# Patient Record
Sex: FEMALE | Race: WHITE | Employment: FULL TIME | URBAN - METROPOLITAN AREA
[De-identification: names, ages, dates, MRNs, and addresses within clinical notes are randomized per-mention and may not be internally consistent; named-entity substitution may affect disease eponyms.]

---

## 2021-02-22 ENCOUNTER — TELEPHONE (OUTPATIENT)
Dept: OBGYN CLINIC | Facility: CLINIC | Age: 36
End: 2021-02-22

## 2021-02-22 NOTE — TELEPHONE ENCOUNTER
Pt calling to report +HPT and LMP of 12/22. Pt stated her OB is in [place] and she has had 2 US's done already but [place] is too far of a commute for her. Pt informed we have male and female providers and she will see all of them throughout care.  Pt infor

## 2021-02-24 ENCOUNTER — TELEPHONE (OUTPATIENT)
Dept: OBGYN CLINIC | Facility: CLINIC | Age: 36
End: 2021-02-24

## 2021-02-24 NOTE — TELEPHONE ENCOUNTER
OB US results dated 2/6/21 and 2/20/21 received via fax. Pt is scheduled for OBN appt on 3/3. Reports placed above Francy's desk in front office.

## 2021-03-03 ENCOUNTER — PATIENT MESSAGE (OUTPATIENT)
Dept: OBGYN CLINIC | Facility: CLINIC | Age: 36
End: 2021-03-03

## 2021-03-03 ENCOUNTER — NURSE ONLY (OUTPATIENT)
Dept: OBGYN CLINIC | Facility: CLINIC | Age: 36
End: 2021-03-03
Payer: COMMERCIAL

## 2021-03-03 VITALS — HEIGHT: 62 IN

## 2021-03-03 DIAGNOSIS — Z34.91 FIRST TRIMESTER PREGNANCY: Primary | ICD-10-CM

## 2021-03-03 RX ORDER — CHOLECALCIFEROL (VITAMIN D3) 25 MCG
1 TABLET,CHEWABLE ORAL DAILY
COMMUNITY

## 2021-03-03 NOTE — TELEPHONE ENCOUNTER
From: Janis Perez  To: Margo Griffin DO  Sent: 3/3/2021 1:39 PM CST  Subject: Non-Urgent Medical Question    I had a Nurse Education phone call today regarding a new pregnancy. I will be a new patient to your practice.    I am going to be going to the

## 2021-03-03 NOTE — PROGRESS NOTES
Pt seen for OBN-PC appt today with no complaints. Normal PN labs, 1 hr GTT ordered. Pt advised all labs must be completed and resulted prior to MD appt. Pt accepted first NPN appt with [facility] on 3/16. Pt advised to come alone d/t current pandemic.      Pt s father, or anyone in either family with:  Patient's age 28 years or older as of estimated date of delivery: Yes   Thalassemia ([facility], [facility], [address] [facility], or  background):  MCV less than 80: No   Neural tube defect (Meningomyelocele, Spina bifida, o

## 2021-03-06 ENCOUNTER — LAB ENCOUNTER (OUTPATIENT)
Dept: LAB | Facility: HOSPITAL | Age: 36
End: 2021-03-06
Attending: OBSTETRICS & GYNECOLOGY
Payer: COMMERCIAL

## 2021-03-06 DIAGNOSIS — Z34.91 FIRST TRIMESTER PREGNANCY: ICD-10-CM

## 2021-03-06 LAB
ANTIBODY SCREEN: NEGATIVE
BASOPHILS # BLD AUTO: 0.03 X10(3) UL (ref 0–0.2)
BASOPHILS NFR BLD AUTO: 0.4 %
DEPRECATED RDW RBC AUTO: 45.1 FL (ref 35.1–46.3)
EOSINOPHIL # BLD AUTO: 0.03 X10(3) UL (ref 0–0.7)
EOSINOPHIL NFR BLD AUTO: 0.4 %
ERYTHROCYTE [DISTWIDTH] IN BLOOD BY AUTOMATED COUNT: 13.2 % (ref 11–15)
GLUCOSE 1H P GLC SERPL-MCNC: 58 MG/DL
HBV SURFACE AG SER-ACNC: <0.1 [IU]/L
HBV SURFACE AG SERPL QL IA: NONREACTIVE
HCT VFR BLD AUTO: 40.5 %
HGB BLD-MCNC: 13.5 G/DL
IMM GRANULOCYTES # BLD AUTO: 0.04 X10(3) UL (ref 0–1)
IMM GRANULOCYTES NFR BLD: 0.5 %
LYMPHOCYTES # BLD AUTO: 1.97 X10(3) UL (ref 1–4)
LYMPHOCYTES NFR BLD AUTO: 25 %
MCH RBC QN AUTO: 31.4 PG (ref 26–34)
MCHC RBC AUTO-ENTMCNC: 33.3 G/DL (ref 31–37)
MCV RBC AUTO: 94.2 FL
MONOCYTES # BLD AUTO: 0.65 X10(3) UL (ref 0.1–1)
MONOCYTES NFR BLD AUTO: 8.3 %
NEUTROPHILS # BLD AUTO: 5.15 X10 (3) UL (ref 1.5–7.7)
NEUTROPHILS # BLD AUTO: 5.15 X10(3) UL (ref 1.5–7.7)
NEUTROPHILS NFR BLD AUTO: 65.4 %
PLATELET # BLD AUTO: 354 10(3)UL (ref 150–450)
RBC # BLD AUTO: 4.3 X10(6)UL
RH BLOOD TYPE: POSITIVE
RUBV IGG SER QL: POSITIVE
RUBV IGG SER-ACNC: 235.9 IU/ML (ref 10–?)
WBC # BLD AUTO: 7.9 X10(3) UL (ref 4–11)

## 2021-03-06 PROCEDURE — 87086 URINE CULTURE/COLONY COUNT: CPT

## 2021-03-06 PROCEDURE — 86762 RUBELLA ANTIBODY: CPT

## 2021-03-06 PROCEDURE — 86901 BLOOD TYPING SEROLOGIC RH(D): CPT

## 2021-03-06 PROCEDURE — 86780 TREPONEMA PALLIDUM: CPT

## 2021-03-06 PROCEDURE — 86850 RBC ANTIBODY SCREEN: CPT

## 2021-03-06 PROCEDURE — 36415 COLL VENOUS BLD VENIPUNCTURE: CPT

## 2021-03-06 PROCEDURE — 87340 HEPATITIS B SURFACE AG IA: CPT

## 2021-03-06 PROCEDURE — 85025 COMPLETE CBC W/AUTO DIFF WBC: CPT

## 2021-03-06 PROCEDURE — 86900 BLOOD TYPING SEROLOGIC ABO: CPT

## 2021-03-06 PROCEDURE — 87389 HIV-1 AG W/HIV-1&-2 AB AG IA: CPT

## 2021-03-06 PROCEDURE — 82950 GLUCOSE TEST: CPT

## 2021-03-08 LAB — T PALLIDUM AB SER QL: NEGATIVE

## 2021-03-16 ENCOUNTER — INITIAL PRENATAL (OUTPATIENT)
Dept: OBGYN CLINIC | Facility: CLINIC | Age: 36
End: 2021-03-16
Payer: COMMERCIAL

## 2021-03-16 VITALS
BODY MASS INDEX: 25.19 KG/M2 | HEART RATE: 102 BPM | WEIGHT: 140.38 LBS | HEIGHT: 62.75 IN | SYSTOLIC BLOOD PRESSURE: 134 MMHG | DIASTOLIC BLOOD PRESSURE: 87 MMHG

## 2021-03-16 DIAGNOSIS — Z34.01 ENCOUNTER FOR SUPERVISION OF NORMAL FIRST PREGNANCY IN FIRST TRIMESTER: Primary | ICD-10-CM

## 2021-03-16 LAB
APPEARANCE: CLEAR
MULTISTIX LOT#: 5077 NUMERIC
PH, URINE: 6.5 (ref 4.5–8)
SPECIFIC GRAVITY: 1.02 (ref 1–1.03)
URINE-COLOR: YELLOW
UROBILINOGEN,SEMI-QN: 0.2 MG/DL (ref 0–1.9)

## 2021-03-16 PROCEDURE — 3008F BODY MASS INDEX DOCD: CPT | Performed by: OBSTETRICS & GYNECOLOGY

## 2021-03-16 PROCEDURE — 81002 URINALYSIS NONAUTO W/O SCOPE: CPT | Performed by: OBSTETRICS & GYNECOLOGY

## 2021-03-16 PROCEDURE — 3075F SYST BP GE 130 - 139MM HG: CPT | Performed by: OBSTETRICS & GYNECOLOGY

## 2021-03-16 PROCEDURE — 3079F DIAST BP 80-89 MM HG: CPT | Performed by: OBSTETRICS & GYNECOLOGY

## 2021-03-17 LAB
C TRACH DNA SPEC QL NAA+PROBE: NEGATIVE
N GONORRHOEA DNA SPEC QL NAA+PROBE: NEGATIVE
T VAGINALIS RRNA SPEC QL NAA+PROBE: NEGATIVE

## 2021-03-24 PROBLEM — O09.529 AMA (ADVANCED MATERNAL AGE) MULTIGRAVIDA 35+ (HCC): Status: ACTIVE | Noted: 2021-03-24

## 2021-03-24 PROBLEM — O09.529 AMA (ADVANCED MATERNAL AGE) MULTIGRAVIDA 35+: Status: ACTIVE | Noted: 2021-03-24

## 2021-03-24 NOTE — PROGRESS NOTES
New OB. No complaints. LMP c/w 1TUS from previous practice. Discussed genetics. PE wnl. Labs reviewed. Had covid vaccine x 2. LPS: 9/4/20 pap/hpv neg. Cultures collected. RTC 4 wks.

## 2021-03-25 ENCOUNTER — TELEPHONE (OUTPATIENT)
Dept: OBGYN CLINIC | Facility: CLINIC | Age: 36
End: 2021-03-25

## 2021-04-09 DIAGNOSIS — Z23 NEED FOR VACCINATION: ICD-10-CM

## 2021-04-15 ENCOUNTER — TELEPHONE (OUTPATIENT)
Dept: OBGYN CLINIC | Facility: CLINIC | Age: 36
End: 2021-04-15

## 2021-04-15 ENCOUNTER — ROUTINE PRENATAL (OUTPATIENT)
Dept: OBGYN CLINIC | Facility: CLINIC | Age: 36
End: 2021-04-15
Payer: COMMERCIAL

## 2021-04-15 VITALS
BODY MASS INDEX: 26 KG/M2 | HEART RATE: 86 BPM | WEIGHT: 144 LBS | DIASTOLIC BLOOD PRESSURE: 78 MMHG | SYSTOLIC BLOOD PRESSURE: 118 MMHG

## 2021-04-15 DIAGNOSIS — O09.521 AMA (ADVANCED MATERNAL AGE) MULTIGRAVIDA 35+, FIRST TRIMESTER: Primary | ICD-10-CM

## 2021-04-15 DIAGNOSIS — Z34.92 ENCOUNTER FOR SUPERVISION OF NORMAL PREGNANCY IN SECOND TRIMESTER, UNSPECIFIED GRAVIDITY: Primary | ICD-10-CM

## 2021-04-15 PROCEDURE — 3074F SYST BP LT 130 MM HG: CPT | Performed by: OBSTETRICS & GYNECOLOGY

## 2021-04-15 PROCEDURE — 3078F DIAST BP <80 MM HG: CPT | Performed by: OBSTETRICS & GYNECOLOGY

## 2021-04-15 PROCEDURE — 81002 URINALYSIS NONAUTO W/O SCOPE: CPT | Performed by: OBSTETRICS & GYNECOLOGY

## 2021-04-15 NOTE — TELEPHONE ENCOUNTER
Pt needs referral for Level 2 ultrasound, 32 wk growth ultrasound and NST at 36 wk for [facility]

## 2021-05-05 NOTE — PROGRESS NOTES
Outpatient Maternal-Fetal Medicine Consultation    Dear Dr. Clifford [place],    Thank you for requesting ultrasound evaluation and maternal fetal medicine consultation on your patient Lesly Soler.   As you are aware she is a 28year old female with a Price pr which I interpreted the results and reviewed them with the patient. Transvaginal US was performed because there appears to be a hypoechoic mass in the anterior cervix by transabdominal US - normal cervical ultrasound by transvaginal ultrasound.     Nay Falk gestational diabetes and preeclampsia; hence, no further significant alterations in obstetric care are advised.     Medical Complications    Women 28years of age or older can expect to experience two to three fold higher rates of hospitalization,  for women 40 years and older. Fetal Malformations    Cardiac malformations, clubfoot, and diaphragmatic hernia appear to occur with increased frequency in offspring of older women.  These abnormalities are structural and unrelated to aneuploidy, thus the satisfaction.       IMPRESSION:  · IUP at 20w2d  · Normal level II Ultrasound  · Left adnexal simple cyst  · Cervical mass suspected by transabdominal ultrasound but it was not seen on transvaginal US  · AMA, declined aneuploidy screening & prenatal genetic

## 2021-05-11 ENCOUNTER — ROUTINE PRENATAL (OUTPATIENT)
Dept: OBGYN CLINIC | Facility: CLINIC | Age: 36
End: 2021-05-11
Payer: COMMERCIAL

## 2021-05-11 VITALS
SYSTOLIC BLOOD PRESSURE: 117 MMHG | BODY MASS INDEX: 26 KG/M2 | HEART RATE: 96 BPM | WEIGHT: 148 LBS | DIASTOLIC BLOOD PRESSURE: 81 MMHG

## 2021-05-11 DIAGNOSIS — Z34.92 ENCOUNTER FOR SUPERVISION OF NORMAL PREGNANCY IN SECOND TRIMESTER, UNSPECIFIED GRAVIDITY: Primary | ICD-10-CM

## 2021-05-11 PROCEDURE — 3079F DIAST BP 80-89 MM HG: CPT | Performed by: OBSTETRICS & GYNECOLOGY

## 2021-05-11 PROCEDURE — 3074F SYST BP LT 130 MM HG: CPT | Performed by: OBSTETRICS & GYNECOLOGY

## 2021-05-11 PROCEDURE — 81002 URINALYSIS NONAUTO W/O SCOPE: CPT | Performed by: OBSTETRICS & GYNECOLOGY

## 2021-05-13 ENCOUNTER — HOSPITAL ENCOUNTER (OUTPATIENT)
Dept: PERINATAL CARE | Facility: HOSPITAL | Age: 36
Discharge: HOME | End: 2021-05-13
Attending: OBSTETRICS & GYNECOLOGY
Payer: COMMERCIAL

## 2021-05-13 VITALS
WEIGHT: 148 LBS | DIASTOLIC BLOOD PRESSURE: 87 MMHG | SYSTOLIC BLOOD PRESSURE: 136 MMHG | BODY MASS INDEX: 26 KG/M2 | HEART RATE: 87 BPM

## 2021-05-13 DIAGNOSIS — Z36.3 SCREENING, ANTENATAL, FOR MALFORMATION BY ULTRASOUND: ICD-10-CM

## 2021-05-13 DIAGNOSIS — O34.42: ICD-10-CM

## 2021-05-13 DIAGNOSIS — O09.522 MULTIGRAVIDA OF ADVANCED MATERNAL AGE IN SECOND TRIMESTER: Primary | ICD-10-CM

## 2021-05-13 DIAGNOSIS — O09.522 MULTIGRAVIDA OF ADVANCED MATERNAL AGE IN SECOND TRIMESTER: ICD-10-CM

## 2021-05-13 DIAGNOSIS — O09.512 PRIMIGRAVIDA OF ADVANCED MATERNAL AGE IN SECOND TRIMESTER: ICD-10-CM

## 2021-05-13 DIAGNOSIS — N88.9 ABNORMAL APPEARANCE OF CERVIX: ICD-10-CM

## 2021-05-13 PROCEDURE — 76817 TRANSVAGINAL US OBSTETRIC: CPT | Performed by: OBSTETRICS & GYNECOLOGY

## 2021-05-13 PROCEDURE — 99243 OFF/OP CNSLTJ NEW/EST LOW 30: CPT | Performed by: OBSTETRICS & GYNECOLOGY

## 2021-05-13 PROCEDURE — 76816 OB US FOLLOW-UP PER FETUS: CPT | Performed by: OBSTETRICS & GYNECOLOGY

## 2021-05-13 PROCEDURE — 76811 OB US DETAILED SNGL FETUS: CPT | Performed by: OBSTETRICS & GYNECOLOGY

## 2021-06-10 ENCOUNTER — ROUTINE PRENATAL (OUTPATIENT)
Dept: OBGYN CLINIC | Facility: CLINIC | Age: 36
End: 2021-06-10
Payer: COMMERCIAL

## 2021-06-10 VITALS
WEIGHT: 153.19 LBS | DIASTOLIC BLOOD PRESSURE: 76 MMHG | HEART RATE: 92 BPM | BODY MASS INDEX: 27 KG/M2 | SYSTOLIC BLOOD PRESSURE: 112 MMHG

## 2021-06-10 DIAGNOSIS — Z34.02 ENCOUNTER FOR SUPERVISION OF NORMAL FIRST PREGNANCY IN SECOND TRIMESTER: Primary | ICD-10-CM

## 2021-06-10 PROCEDURE — 81002 URINALYSIS NONAUTO W/O SCOPE: CPT | Performed by: OBSTETRICS & GYNECOLOGY

## 2021-06-10 PROCEDURE — 3074F SYST BP LT 130 MM HG: CPT | Performed by: OBSTETRICS & GYNECOLOGY

## 2021-06-10 PROCEDURE — 3078F DIAST BP <80 MM HG: CPT | Performed by: OBSTETRICS & GYNECOLOGY

## 2021-06-17 ENCOUNTER — LAB ENCOUNTER (OUTPATIENT)
Dept: LAB | Facility: HOSPITAL | Age: 36
End: 2021-06-17
Attending: OBSTETRICS & GYNECOLOGY
Payer: COMMERCIAL

## 2021-06-17 PROCEDURE — 85027 COMPLETE CBC AUTOMATED: CPT | Performed by: OBSTETRICS & GYNECOLOGY

## 2021-06-17 PROCEDURE — 82950 GLUCOSE TEST: CPT | Performed by: OBSTETRICS & GYNECOLOGY

## 2021-06-17 PROCEDURE — 36415 COLL VENOUS BLD VENIPUNCTURE: CPT | Performed by: OBSTETRICS & GYNECOLOGY

## 2021-07-08 ENCOUNTER — ROUTINE PRENATAL (OUTPATIENT)
Dept: OBGYN CLINIC | Facility: CLINIC | Age: 36
End: 2021-07-08
Payer: COMMERCIAL

## 2021-07-08 VITALS
HEART RATE: 96 BPM | BODY MASS INDEX: 28 KG/M2 | SYSTOLIC BLOOD PRESSURE: 119 MMHG | DIASTOLIC BLOOD PRESSURE: 74 MMHG | WEIGHT: 154 LBS

## 2021-07-08 DIAGNOSIS — Z34.91 ENCOUNTER FOR SUPERVISION OF NORMAL PREGNANCY IN FIRST TRIMESTER, UNSPECIFIED GRAVIDITY: Primary | ICD-10-CM

## 2021-07-08 LAB
APPEARANCE: CLEAR
BILIRUBIN: NEGATIVE
GLUCOSE (URINE DIPSTICK): NEGATIVE MG/DL
KETONES (URINE DIPSTICK): NEGATIVE MG/DL
LEUKOCYTES: NEGATIVE
MULTISTIX LOT#: 1027 NUMERIC
NITRITE, URINE: NEGATIVE
OCCULT BLOOD: NEGATIVE
PH, URINE: 7 (ref 4.5–8)
PROTEIN (URINE DIPSTICK): NEGATIVE MG/DL
SPECIFIC GRAVITY: 1 (ref 1–1.03)
URINE-COLOR: YELLOW
UROBILINOGEN,SEMI-QN: 0.2 MG/DL (ref 0–1.9)

## 2021-07-08 PROCEDURE — 81002 URINALYSIS NONAUTO W/O SCOPE: CPT | Performed by: OBSTETRICS & GYNECOLOGY

## 2021-07-08 PROCEDURE — 3078F DIAST BP <80 MM HG: CPT | Performed by: OBSTETRICS & GYNECOLOGY

## 2021-07-08 PROCEDURE — 3074F SYST BP LT 130 MM HG: CPT | Performed by: OBSTETRICS & GYNECOLOGY

## 2021-07-21 ENCOUNTER — ROUTINE PRENATAL (OUTPATIENT)
Dept: OBGYN CLINIC | Facility: CLINIC | Age: 36
End: 2021-07-21
Payer: COMMERCIAL

## 2021-07-21 VITALS
DIASTOLIC BLOOD PRESSURE: 76 MMHG | SYSTOLIC BLOOD PRESSURE: 119 MMHG | HEART RATE: 88 BPM | BODY MASS INDEX: 28 KG/M2 | WEIGHT: 155 LBS

## 2021-07-21 DIAGNOSIS — Z34.03 ENCOUNTER FOR SUPERVISION OF NORMAL FIRST PREGNANCY IN THIRD TRIMESTER: Primary | ICD-10-CM

## 2021-07-21 LAB
APPEARANCE: CLEAR
GLUCOSE (URINE DIPSTICK): NEGATIVE MG/DL
KETONES (URINE DIPSTICK): NEGATIVE MG/DL
MULTISTIX LOT#: NORMAL NUMERIC
NITRITE, URINE: NEGATIVE
PROTEIN (URINE DIPSTICK): NEGATIVE MG/DL
URINE-COLOR: YELLOW

## 2021-07-21 PROCEDURE — 3074F SYST BP LT 130 MM HG: CPT | Performed by: OBSTETRICS & GYNECOLOGY

## 2021-07-21 PROCEDURE — 90471 IMMUNIZATION ADMIN: CPT | Performed by: OBSTETRICS & GYNECOLOGY

## 2021-07-21 PROCEDURE — 3078F DIAST BP <80 MM HG: CPT | Performed by: OBSTETRICS & GYNECOLOGY

## 2021-07-21 PROCEDURE — 81002 URINALYSIS NONAUTO W/O SCOPE: CPT | Performed by: OBSTETRICS & GYNECOLOGY

## 2021-07-21 PROCEDURE — 90715 TDAP VACCINE 7 YRS/> IM: CPT | Performed by: OBSTETRICS & GYNECOLOGY

## 2021-08-03 ENCOUNTER — TELEPHONE (OUTPATIENT)
Dept: OBGYN CLINIC | Facility: CLINIC | Age: 36
End: 2021-08-03

## 2021-08-05 ENCOUNTER — ROUTINE PRENATAL (OUTPATIENT)
Dept: OBGYN CLINIC | Facility: CLINIC | Age: 36
End: 2021-08-05
Payer: COMMERCIAL

## 2021-08-05 VITALS
HEART RATE: 93 BPM | BODY MASS INDEX: 28 KG/M2 | WEIGHT: 155.38 LBS | DIASTOLIC BLOOD PRESSURE: 74 MMHG | SYSTOLIC BLOOD PRESSURE: 112 MMHG

## 2021-08-05 DIAGNOSIS — Z34.03 ENCOUNTER FOR SUPERVISION OF NORMAL FIRST PREGNANCY IN THIRD TRIMESTER: Primary | ICD-10-CM

## 2021-08-05 LAB
BILIRUBIN: NEGATIVE
GLUCOSE (URINE DIPSTICK): NEGATIVE MG/DL
KETONES (URINE DIPSTICK): NEGATIVE MG/DL
MULTISTIX EXPIRATION DATE: ABNORMAL DATE
MULTISTIX LOT#: 1027 NUMERIC
NITRITE, URINE: NEGATIVE
OCCULT BLOOD: NEGATIVE
PH, URINE: 6.5 (ref 4.5–8)
PROTEIN (URINE DIPSTICK): NEGATIVE MG/DL
SPECIFIC GRAVITY: 1.01 (ref 1–1.03)
UROBILINOGEN,SEMI-QN: 0.2 MG/DL (ref 0–1.9)

## 2021-08-05 PROCEDURE — 81002 URINALYSIS NONAUTO W/O SCOPE: CPT | Performed by: OBSTETRICS & GYNECOLOGY

## 2021-08-05 PROCEDURE — 3074F SYST BP LT 130 MM HG: CPT | Performed by: OBSTETRICS & GYNECOLOGY

## 2021-08-05 PROCEDURE — 3078F DIAST BP <80 MM HG: CPT | Performed by: OBSTETRICS & GYNECOLOGY

## 2021-08-05 NOTE — PROGRESS NOTES
Irena Dickson     Dear Dr. Celestino Cardona,     Thank you for requesting ultrasound evaluation and maternal fetal medicine consultation on your patient Sergio Matthews.   As you are aware she is a 28year old female  with a [employer] physician. We discussed the recommended plan of care based on her  risk factors.   Koki Mena and her significant other, Liseth Richards, had their questions answered to their satisfaction.        IMPRESSION:  · IUP at 33w2d  · Normal fetal growth and

## 2021-08-12 ENCOUNTER — HOSPITAL ENCOUNTER (OUTPATIENT)
Dept: PERINATAL CARE | Facility: HOSPITAL | Age: 36
Discharge: HOME | End: 2021-08-12
Attending: OBSTETRICS & GYNECOLOGY
Payer: COMMERCIAL

## 2021-08-12 VITALS
SYSTOLIC BLOOD PRESSURE: 125 MMHG | HEART RATE: 105 BPM | BODY MASS INDEX: 28 KG/M2 | DIASTOLIC BLOOD PRESSURE: 78 MMHG | WEIGHT: 155 LBS

## 2021-08-12 DIAGNOSIS — O09.523 MULTIGRAVIDA OF ADVANCED MATERNAL AGE IN THIRD TRIMESTER: Primary | ICD-10-CM

## 2021-08-12 DIAGNOSIS — O09.523 MULTIGRAVIDA OF ADVANCED MATERNAL AGE IN THIRD TRIMESTER: ICD-10-CM

## 2021-08-12 DIAGNOSIS — O09.513 PRIMIGRAVIDA OF ADVANCED MATERNAL AGE IN THIRD TRIMESTER: ICD-10-CM

## 2021-08-12 PROCEDURE — 76819 FETAL BIOPHYS PROFIL W/O NST: CPT | Performed by: OBSTETRICS & GYNECOLOGY

## 2021-08-12 PROCEDURE — 76816 OB US FOLLOW-UP PER FETUS: CPT | Performed by: OBSTETRICS & GYNECOLOGY

## 2021-08-12 PROCEDURE — 99213 OFFICE O/P EST LOW 20 MIN: CPT | Performed by: OBSTETRICS & GYNECOLOGY

## 2021-08-19 ENCOUNTER — ROUTINE PRENATAL (OUTPATIENT)
Dept: OBGYN CLINIC | Facility: CLINIC | Age: 36
End: 2021-08-19
Payer: COMMERCIAL

## 2021-08-19 VITALS
WEIGHT: 154 LBS | BODY MASS INDEX: 28 KG/M2 | DIASTOLIC BLOOD PRESSURE: 72 MMHG | HEART RATE: 96 BPM | SYSTOLIC BLOOD PRESSURE: 108 MMHG

## 2021-08-19 DIAGNOSIS — Z34.93 ENCOUNTER FOR SUPERVISION OF NORMAL PREGNANCY IN THIRD TRIMESTER, UNSPECIFIED GRAVIDITY: Primary | ICD-10-CM

## 2021-08-19 DIAGNOSIS — O09.513 AMA (ADVANCED MATERNAL AGE) PRIMIGRAVIDA 35+, THIRD TRIMESTER: ICD-10-CM

## 2021-08-19 LAB
APPEARANCE: CLEAR
BILIRUBIN: NEGATIVE
GLUCOSE (URINE DIPSTICK): NEGATIVE MG/DL
KETONES (URINE DIPSTICK): NEGATIVE MG/DL
MULTISTIX LOT#: 5077 NUMERIC
NITRITE, URINE: NEGATIVE
OCCULT BLOOD: NEGATIVE
PH, URINE: 7.5 (ref 4.5–8)
SPECIFIC GRAVITY: 1.01 (ref 1–1.03)
URINE-COLOR: YELLOW
UROBILINOGEN,SEMI-QN: 0.2 MG/DL (ref 0–1.9)

## 2021-08-19 PROCEDURE — 3074F SYST BP LT 130 MM HG: CPT | Performed by: OBSTETRICS & GYNECOLOGY

## 2021-08-19 PROCEDURE — 81002 URINALYSIS NONAUTO W/O SCOPE: CPT | Performed by: OBSTETRICS & GYNECOLOGY

## 2021-08-19 PROCEDURE — 3078F DIAST BP <80 MM HG: CPT | Performed by: OBSTETRICS & GYNECOLOGY

## 2021-08-25 ENCOUNTER — TELEPHONE (OUTPATIENT)
Dept: OBGYN CLINIC | Facility: CLINIC | Age: 36
End: 2021-08-25

## 2021-08-27 ENCOUNTER — LAB ENCOUNTER (OUTPATIENT)
Dept: LAB | Facility: HOSPITAL | Age: 36
End: 2021-08-27
Attending: OBSTETRICS & GYNECOLOGY
Payer: COMMERCIAL

## 2021-08-27 DIAGNOSIS — Z34.93 ENCOUNTER FOR SUPERVISION OF NORMAL PREGNANCY IN THIRD TRIMESTER, UNSPECIFIED GRAVIDITY: ICD-10-CM

## 2021-08-27 LAB
DEPRECATED RDW RBC AUTO: 45.3 FL (ref 35.1–46.3)
ERYTHROCYTE [DISTWIDTH] IN BLOOD BY AUTOMATED COUNT: 13.2 % (ref 11–15)
HCT VFR BLD AUTO: 35.7 %
HGB BLD-MCNC: 11.8 G/DL
MCH RBC QN AUTO: 31.6 PG (ref 26–34)
MCHC RBC AUTO-ENTMCNC: 33.1 G/DL (ref 31–37)
MCV RBC AUTO: 95.5 FL
PLATELET # BLD AUTO: 344 10(3)UL (ref 150–450)
RBC # BLD AUTO: 3.74 X10(6)UL
WBC # BLD AUTO: 15.2 X10(3) UL (ref 4–11)

## 2021-08-27 PROCEDURE — 87389 HIV-1 AG W/HIV-1&-2 AB AG IA: CPT

## 2021-08-27 PROCEDURE — 36415 COLL VENOUS BLD VENIPUNCTURE: CPT

## 2021-08-27 PROCEDURE — 86780 TREPONEMA PALLIDUM: CPT

## 2021-08-27 PROCEDURE — 85027 COMPLETE CBC AUTOMATED: CPT

## 2021-08-30 ENCOUNTER — NST DOCUMENTATION (OUTPATIENT)
Dept: OBGYN CLINIC | Facility: CLINIC | Age: 36
End: 2021-08-30

## 2021-08-30 ENCOUNTER — HOSPITAL ENCOUNTER (OUTPATIENT)
Facility: HOSPITAL | Age: 36
Discharge: HOME | End: 2021-08-30
Attending: OBSTETRICS & GYNECOLOGY | Admitting: OBSTETRICS & GYNECOLOGY
Payer: COMMERCIAL

## 2021-08-30 ENCOUNTER — APPOINTMENT (OUTPATIENT)
Dept: OBGYN CLINIC | Facility: HOSPITAL | Age: 36
End: 2021-08-30
Attending: OBSTETRICS & GYNECOLOGY
Payer: COMMERCIAL

## 2021-08-30 VITALS — HEART RATE: 89 BPM | SYSTOLIC BLOOD PRESSURE: 123 MMHG | DIASTOLIC BLOOD PRESSURE: 78 MMHG

## 2021-08-30 DIAGNOSIS — O09.513 AMA (ADVANCED MATERNAL AGE) PRIMIGRAVIDA 35+, THIRD TRIMESTER: ICD-10-CM

## 2021-08-30 LAB — T PALLIDUM AB SER QL: NEGATIVE

## 2021-08-30 PROCEDURE — 59025 FETAL NON-STRESS TEST: CPT

## 2021-08-30 PROCEDURE — 59025 FETAL NON-STRESS TEST: CPT | Performed by: OBSTETRICS & GYNECOLOGY

## 2021-08-30 NOTE — NST
Nonstress Test   Patient: Virgil Slater    Gestation: 35w6d    NST: AMA       Variability: Moderate           Accelerations: Yes           Decelerations: None            Baseline: 135 BPM           Uterine Irritability: No           Contractions: Irregular

## 2021-08-30 NOTE — PROGRESS NOTES
Pt is a 28year old female admitted to TR2/TR2-A. Patient presents with:  Non-stress Test: AMA     Pt is  35w6d intra-uterine pregnancy. History obtained, consents signed. Oriented to room, staff, and plan of care.

## 2021-08-31 ENCOUNTER — ROUTINE PRENATAL (OUTPATIENT)
Dept: OBGYN CLINIC | Facility: CLINIC | Age: 36
End: 2021-08-31
Payer: COMMERCIAL

## 2021-08-31 VITALS
SYSTOLIC BLOOD PRESSURE: 107 MMHG | BODY MASS INDEX: 28 KG/M2 | HEART RATE: 102 BPM | DIASTOLIC BLOOD PRESSURE: 73 MMHG | WEIGHT: 156 LBS

## 2021-08-31 DIAGNOSIS — Z34.93 ENCOUNTER FOR SUPERVISION OF NORMAL PREGNANCY IN THIRD TRIMESTER, UNSPECIFIED GRAVIDITY: Primary | ICD-10-CM

## 2021-08-31 LAB
APPEARANCE: CLEAR
BILIRUBIN: NEGATIVE
GLUCOSE (URINE DIPSTICK): NEGATIVE MG/DL
KETONES (URINE DIPSTICK): NEGATIVE MG/DL
LEUKOCYTES: NEGATIVE
MULTISTIX LOT#: NORMAL NUMERIC
NITRITE, URINE: NEGATIVE
OCCULT BLOOD: NEGATIVE
PH, URINE: 7 (ref 4.5–8)
PROTEIN (URINE DIPSTICK): NEGATIVE MG/DL
SPECIFIC GRAVITY: 1.01 (ref 1–1.03)
URINE-COLOR: YELLOW
UROBILINOGEN,SEMI-QN: 0.2 MG/DL (ref 0–1.9)

## 2021-08-31 PROCEDURE — 3074F SYST BP LT 130 MM HG: CPT | Performed by: OBSTETRICS & GYNECOLOGY

## 2021-08-31 PROCEDURE — 81002 URINALYSIS NONAUTO W/O SCOPE: CPT | Performed by: OBSTETRICS & GYNECOLOGY

## 2021-08-31 PROCEDURE — 3078F DIAST BP <80 MM HG: CPT | Performed by: OBSTETRICS & GYNECOLOGY

## 2021-08-31 NOTE — NST
Nonstress Test   Patient: Lori Jacobo    Gestation: 35w6d    Diagnosis from order: AMA (advanced maternal age) primigravida 33+, third trimester  Inpatient order, no diagnosis associated       NST:         NST DOCUMENTATION 8/30/2021   Variability 6-25 BPM

## 2021-09-02 LAB — GROUP B STREP BY PCR FOR PCR OVT: NEGATIVE

## 2021-09-06 ENCOUNTER — APPOINTMENT (OUTPATIENT)
Dept: OBGYN CLINIC | Facility: HOSPITAL | Age: 36
End: 2021-09-06
Attending: OBSTETRICS & GYNECOLOGY
Payer: COMMERCIAL

## 2021-09-06 ENCOUNTER — HOSPITAL ENCOUNTER (OUTPATIENT)
Facility: HOSPITAL | Age: 36
Discharge: HOME | End: 2021-09-06
Attending: OBSTETRICS & GYNECOLOGY | Admitting: OBSTETRICS & GYNECOLOGY
Payer: COMMERCIAL

## 2021-09-06 ENCOUNTER — APPOINTMENT (OUTPATIENT)
Dept: ULTRASOUND IMAGING | Facility: HOSPITAL | Age: 36
End: 2021-09-06
Attending: OBSTETRICS & GYNECOLOGY
Payer: COMMERCIAL

## 2021-09-06 DIAGNOSIS — O09.513 AMA (ADVANCED MATERNAL AGE) PRIMIGRAVIDA 35+, THIRD TRIMESTER: ICD-10-CM

## 2021-09-06 PROCEDURE — 59025 FETAL NON-STRESS TEST: CPT

## 2021-09-06 PROCEDURE — 76819 FETAL BIOPHYS PROFIL W/O NST: CPT | Performed by: OBSTETRICS & GYNECOLOGY

## 2021-09-07 NOTE — TELEPHONE ENCOUNTER
Forms completd and faxed to FRANNIE BRADFORD [facility] at [phone]. Sent [employer].

## 2021-09-07 NOTE — TELEPHONE ENCOUNTER
Dr. Ken Montero,     Please sign off on form: Disab   -Highlight the patient and hit \"Chart\" button.   -In Chart Review, w/in the Encounter tab - click 1 time on the Telephone call encounter for 8/25/21 Scroll down the telephone encounter.  -Click \"scan on\" bl

## 2021-09-08 ENCOUNTER — ROUTINE PRENATAL (OUTPATIENT)
Dept: OBGYN CLINIC | Facility: CLINIC | Age: 36
End: 2021-09-08
Payer: COMMERCIAL

## 2021-09-08 VITALS
BODY MASS INDEX: 28 KG/M2 | WEIGHT: 159 LBS | HEART RATE: 112 BPM | SYSTOLIC BLOOD PRESSURE: 126 MMHG | DIASTOLIC BLOOD PRESSURE: 81 MMHG

## 2021-09-08 DIAGNOSIS — Z34.83 ENCOUNTER FOR SUPERVISION OF OTHER NORMAL PREGNANCY IN THIRD TRIMESTER: Primary | ICD-10-CM

## 2021-09-08 LAB
BILIRUBIN: NEGATIVE
GLUCOSE (URINE DIPSTICK): NEGATIVE MG/DL
KETONES (URINE DIPSTICK): NEGATIVE MG/DL
MULTISTIX EXPIRATION DATE: ABNORMAL DATE
MULTISTIX LOT#: 5077 NUMERIC
NITRITE, URINE: NEGATIVE
OCCULT BLOOD: NEGATIVE
PH, URINE: 6.5 (ref 4.5–8)
SPECIFIC GRAVITY: 1.02 (ref 1–1.03)
UROBILINOGEN,SEMI-QN: 0.2 MG/DL (ref 0–1.9)

## 2021-09-08 PROCEDURE — 3079F DIAST BP 80-89 MM HG: CPT | Performed by: OBSTETRICS & GYNECOLOGY

## 2021-09-08 PROCEDURE — 81002 URINALYSIS NONAUTO W/O SCOPE: CPT | Performed by: OBSTETRICS & GYNECOLOGY

## 2021-09-08 PROCEDURE — 3074F SYST BP LT 130 MM HG: CPT | Performed by: OBSTETRICS & GYNECOLOGY

## 2021-09-13 ENCOUNTER — APPOINTMENT (OUTPATIENT)
Dept: OBGYN CLINIC | Facility: HOSPITAL | Age: 36
End: 2021-09-13
Attending: OBSTETRICS & GYNECOLOGY
Payer: COMMERCIAL

## 2021-09-13 ENCOUNTER — HOSPITAL ENCOUNTER (OUTPATIENT)
Facility: HOSPITAL | Age: 36
Discharge: HOME | End: 2021-09-13
Attending: OBSTETRICS & GYNECOLOGY | Admitting: OBSTETRICS & GYNECOLOGY
Payer: COMMERCIAL

## 2021-09-13 VITALS — SYSTOLIC BLOOD PRESSURE: 123 MMHG | DIASTOLIC BLOOD PRESSURE: 82 MMHG | HEART RATE: 92 BPM

## 2021-09-13 DIAGNOSIS — O09.513 AMA (ADVANCED MATERNAL AGE) PRIMIGRAVIDA 35+, THIRD TRIMESTER: ICD-10-CM

## 2021-09-13 PROCEDURE — 59025 FETAL NON-STRESS TEST: CPT

## 2021-09-13 PROCEDURE — 59025 FETAL NON-STRESS TEST: CPT | Performed by: OBSTETRICS & GYNECOLOGY

## 2021-09-13 NOTE — NST
Nonstress Test   Patient: Eltyree [place]    Gestation: 37w6d    NST: AMA       Variability: Moderate           Accelerations: Yes           Decelerations: None            Baseline: 130 BPM           Uterine Irritability: No           Contractions: Irregular

## 2021-09-14 ENCOUNTER — ROUTINE PRENATAL (OUTPATIENT)
Dept: OBGYN CLINIC | Facility: CLINIC | Age: 36
End: 2021-09-14
Payer: COMMERCIAL

## 2021-09-14 VITALS
WEIGHT: 159 LBS | HEART RATE: 99 BPM | SYSTOLIC BLOOD PRESSURE: 113 MMHG | BODY MASS INDEX: 28 KG/M2 | DIASTOLIC BLOOD PRESSURE: 75 MMHG

## 2021-09-14 DIAGNOSIS — Z34.91 ENCOUNTER FOR SUPERVISION OF NORMAL PREGNANCY IN FIRST TRIMESTER, UNSPECIFIED GRAVIDITY: Primary | ICD-10-CM

## 2021-09-14 LAB
APPEARANCE: CLEAR
BILIRUBIN: NEGATIVE
GLUCOSE (URINE DIPSTICK): NEGATIVE MG/DL
KETONES (URINE DIPSTICK): NEGATIVE MG/DL
LEUKOCYTES: NEGATIVE
MULTISTIX LOT#: 1027 NUMERIC
NITRITE, URINE: NEGATIVE
OCCULT BLOOD: NEGATIVE
PH, URINE: 7 (ref 4.5–8)
SPECIFIC GRAVITY: 1.01 (ref 1–1.03)
URINE-COLOR: YELLOW
UROBILINOGEN,SEMI-QN: 0.2 MG/DL (ref 0–1.9)

## 2021-09-14 PROCEDURE — 3078F DIAST BP <80 MM HG: CPT | Performed by: OBSTETRICS & GYNECOLOGY

## 2021-09-14 PROCEDURE — 3074F SYST BP LT 130 MM HG: CPT | Performed by: OBSTETRICS & GYNECOLOGY

## 2021-09-14 PROCEDURE — 81002 URINALYSIS NONAUTO W/O SCOPE: CPT | Performed by: OBSTETRICS & GYNECOLOGY

## 2021-09-15 ENCOUNTER — ANESTHESIA (OUTPATIENT)
Dept: OBGYN UNIT | Facility: HOSPITAL | Age: 36
End: 2021-09-15
Payer: COMMERCIAL

## 2021-09-15 ENCOUNTER — ANESTHESIA EVENT (OUTPATIENT)
Dept: OBGYN UNIT | Facility: HOSPITAL | Age: 36
End: 2021-09-15
Payer: COMMERCIAL

## 2021-09-15 ENCOUNTER — HOSPITAL ENCOUNTER (INPATIENT)
Facility: HOSPITAL | Age: 36
LOS: 2 days | Discharge: HOME | End: 2021-09-17
Attending: OBSTETRICS & GYNECOLOGY | Admitting: OBSTETRICS & GYNECOLOGY
Payer: COMMERCIAL

## 2021-09-15 ENCOUNTER — TELEPHONE (OUTPATIENT)
Dept: OBGYN CLINIC | Facility: CLINIC | Age: 36
End: 2021-09-15

## 2021-09-15 PROBLEM — Z34.90 PREGNANCY: Status: ACTIVE | Noted: 2021-09-15

## 2021-09-15 PROBLEM — Z34.90 PREGNANCY (HCC): Status: ACTIVE | Noted: 2021-09-15

## 2021-09-15 LAB
ANTIBODY SCREEN: NEGATIVE
BASOPHILS # BLD AUTO: 0.03 X10(3) UL (ref 0–0.2)
BASOPHILS NFR BLD AUTO: 0.2 %
DEPRECATED RDW RBC AUTO: 45.8 FL (ref 35.1–46.3)
EOSINOPHIL # BLD AUTO: 0 X10(3) UL (ref 0–0.7)
EOSINOPHIL NFR BLD AUTO: 0 %
ERYTHROCYTE [DISTWIDTH] IN BLOOD BY AUTOMATED COUNT: 13.4 % (ref 11–15)
HCT VFR BLD AUTO: 36.9 %
HGB BLD-MCNC: 12.6 G/DL
IMM GRANULOCYTES # BLD AUTO: 0.22 X10(3) UL (ref 0–1)
IMM GRANULOCYTES NFR BLD: 1.2 %
LYMPHOCYTES # BLD AUTO: 1.28 X10(3) UL (ref 1–4)
LYMPHOCYTES NFR BLD AUTO: 6.8 %
MCH RBC QN AUTO: 32 PG (ref 26–34)
MCHC RBC AUTO-ENTMCNC: 34.1 G/DL (ref 31–37)
MCV RBC AUTO: 93.7 FL
MONOCYTES # BLD AUTO: 0.58 X10(3) UL (ref 0.1–1)
MONOCYTES NFR BLD AUTO: 3.1 %
NEUTROPHILS # BLD AUTO: 16.59 X10 (3) UL (ref 1.5–7.7)
NEUTROPHILS # BLD AUTO: 16.59 X10(3) UL (ref 1.5–7.7)
NEUTROPHILS NFR BLD AUTO: 88.7 %
PLATELET # BLD AUTO: 311 10(3)UL (ref 150–450)
RBC # BLD AUTO: 3.94 X10(6)UL
RH BLOOD TYPE: POSITIVE
SARS-COV-2 RNA RESP QL NAA+PROBE: NOT DETECTED
WBC # BLD AUTO: 18.7 X10(3) UL (ref 4–11)

## 2021-09-15 PROCEDURE — 0HQ9XZZ REPAIR PERINEUM SKIN, EXTERNAL APPROACH: ICD-10-PCS | Performed by: OBSTETRICS & GYNECOLOGY

## 2021-09-15 PROCEDURE — 0UQMXZZ REPAIR VULVA, EXTERNAL APPROACH: ICD-10-PCS | Performed by: OBSTETRICS & GYNECOLOGY

## 2021-09-15 PROCEDURE — 59410 OBSTETRICAL CARE: CPT | Performed by: OBSTETRICS & GYNECOLOGY

## 2021-09-15 PROCEDURE — 10907ZC DRAINAGE OF AMNIOTIC FLUID, THERAPEUTIC FROM PRODUCTS OF CONCEPTION, VIA NATURAL OR ARTIFICIAL OPENING: ICD-10-PCS | Performed by: OBSTETRICS & GYNECOLOGY

## 2021-09-15 RX ORDER — ACETAMINOPHEN 500 MG
500 TABLET ORAL EVERY 6 HOURS PRN
Status: DISCONTINUED | OUTPATIENT
Start: 2021-09-15 | End: 2021-09-16

## 2021-09-15 RX ORDER — CARBOPROST TROMETHAMINE 250 UG/ML
INJECTION, SOLUTION INTRAMUSCULAR
Status: COMPLETED
Start: 2021-09-15 | End: 2021-09-16

## 2021-09-15 RX ORDER — DEXTROSE, SODIUM CHLORIDE, SODIUM LACTATE, POTASSIUM CHLORIDE, AND CALCIUM CHLORIDE 5; .6; .31; .03; .02 G/100ML; G/100ML; G/100ML; G/100ML; G/100ML
INJECTION, SOLUTION INTRAVENOUS AS NEEDED
Status: DISCONTINUED | OUTPATIENT
Start: 2021-09-15 | End: 2021-09-16 | Stop reason: HOSPADM

## 2021-09-15 RX ORDER — ONDANSETRON 2 MG/ML
4 INJECTION INTRAMUSCULAR; INTRAVENOUS EVERY 6 HOURS PRN
Status: DISCONTINUED | OUTPATIENT
Start: 2021-09-15 | End: 2021-09-16

## 2021-09-15 RX ORDER — TRANEXAMIC ACID 10 MG/ML
INJECTION, SOLUTION INTRAVENOUS
Status: COMPLETED
Start: 2021-09-15 | End: 2021-09-16

## 2021-09-15 RX ORDER — LIDOCAINE HYDROCHLORIDE 10 MG/ML
INJECTION, SOLUTION EPIDURAL; INFILTRATION; INTRACAUDAL; PERINEURAL AS NEEDED
Status: DISCONTINUED | OUTPATIENT
Start: 2021-09-15 | End: 2021-09-15 | Stop reason: SURG

## 2021-09-15 RX ORDER — TRISODIUM CITRATE DIHYDRATE AND CITRIC ACID MONOHYDRATE 500; 334 MG/5ML; MG/5ML
30 SOLUTION ORAL AS NEEDED
Status: DISCONTINUED | OUTPATIENT
Start: 2021-09-15 | End: 2021-09-16 | Stop reason: HOSPADM

## 2021-09-15 RX ORDER — BUPIVACAINE HCL/0.9 % NACL/PF 0.25 %
5 PLASTIC BAG, INJECTION (ML) EPIDURAL AS NEEDED
Status: DISCONTINUED | OUTPATIENT
Start: 2021-09-15 | End: 2021-09-17

## 2021-09-15 RX ORDER — METHYLERGONOVINE MALEATE 0.2 MG/ML
INJECTION INTRAVENOUS
Status: DISCONTINUED
Start: 2021-09-15 | End: 2021-09-16 | Stop reason: WASHOUT

## 2021-09-15 RX ORDER — MISOPROSTOL 200 UG/1
TABLET ORAL
Status: DISPENSED
Start: 2021-09-15 | End: 2021-09-16

## 2021-09-15 RX ORDER — LIDOCAINE HYDROCHLORIDE AND EPINEPHRINE 15; 5 MG/ML; UG/ML
INJECTION, SOLUTION EPIDURAL AS NEEDED
Status: DISCONTINUED | OUTPATIENT
Start: 2021-09-15 | End: 2021-09-15 | Stop reason: SURG

## 2021-09-15 RX ORDER — TERBUTALINE SULFATE 1 MG/ML
0.25 INJECTION, SOLUTION SUBCUTANEOUS AS NEEDED
Status: DISCONTINUED | OUTPATIENT
Start: 2021-09-15 | End: 2021-09-16 | Stop reason: HOSPADM

## 2021-09-15 RX ORDER — SODIUM CHLORIDE, SODIUM LACTATE, POTASSIUM CHLORIDE, CALCIUM CHLORIDE 600; 310; 30; 20 MG/100ML; MG/100ML; MG/100ML; MG/100ML
INJECTION, SOLUTION INTRAVENOUS CONTINUOUS
Status: DISCONTINUED | OUTPATIENT
Start: 2021-09-15 | End: 2021-09-16 | Stop reason: HOSPADM

## 2021-09-15 RX ORDER — IBUPROFEN 600 MG/1
600 TABLET ORAL EVERY 6 HOURS PRN
Status: DISCONTINUED | OUTPATIENT
Start: 2021-09-15 | End: 2021-09-16

## 2021-09-15 RX ORDER — BUPIVACAINE HYDROCHLORIDE 2.5 MG/ML
20 INJECTION, SOLUTION EPIDURAL; INFILTRATION; INTRACAUDAL ONCE
Status: COMPLETED | OUTPATIENT
Start: 2021-09-15 | End: 2021-09-15

## 2021-09-15 RX ORDER — NALBUPHINE HCL 10 MG/ML
2.5 AMPUL (ML) INJECTION
Status: DISCONTINUED | OUTPATIENT
Start: 2021-09-15 | End: 2021-09-17

## 2021-09-15 RX ORDER — AMMONIA INHALANTS 0.04 G/.3ML
0.3 INHALANT RESPIRATORY (INHALATION) AS NEEDED
Status: DISCONTINUED | OUTPATIENT
Start: 2021-09-15 | End: 2021-09-16

## 2021-09-15 RX ORDER — LIDOCAINE HYDROCHLORIDE 10 MG/ML
30 INJECTION, SOLUTION EPIDURAL; INFILTRATION; INTRACAUDAL; PERINEURAL ONCE
Status: DISCONTINUED | OUTPATIENT
Start: 2021-09-15 | End: 2021-09-16 | Stop reason: HOSPADM

## 2021-09-15 RX ADMIN — BUPIVACAINE HYDROCHLORIDE 10 ML: 2.5 INJECTION, SOLUTION EPIDURAL; INFILTRATION; INTRACAUDAL at 20:26:00

## 2021-09-15 RX ADMIN — LIDOCAINE HYDROCHLORIDE 3 ML: 10 INJECTION, SOLUTION EPIDURAL; INFILTRATION; INTRACAUDAL; PERINEURAL at 20:22:00

## 2021-09-15 RX ADMIN — LIDOCAINE HYDROCHLORIDE AND EPINEPHRINE 5 ML: 15; 5 INJECTION, SOLUTION EPIDURAL at 20:25:00

## 2021-09-15 NOTE — TELEPHONE ENCOUNTER
38w1d while attempting to triage pt  answered the call states pt having a contraction and unable to speak. States contractions are taking breath away and have now increased in intensity. States contractions have been consistent every 10 minutes since this AM. Describes as a pressure pain that start in the front part of abdominal area and radiates to the back. States since contractions increased in intensity noticed a decreased in fetal movement. Denies LOF or bleeding.  states are 4 minutes away. Advised to come to [facility] for r/o labor. States understanding. CAP on-call pagedMela Tristan at [facility] informed.

## 2021-09-15 NOTE — TELEPHONE ENCOUNTER
38w1d. Pt states since 330am, back and abdominal cramping  every 10-15 mins. Pt states a \"few\" earlier have taken her breath away. Pt states if she is sitting she has to get up and move around. Pt states they last 30 seconds long. States +FM, denies LOF and no VB. No UTI s/s or constipation issue. last BM was yesterday. Pt states she is hydrating well, has drank at least 48 oz of water this morning. Pt encouraged to take warm shower, ambulate, hip rocking, discussed proper breathing techniques, continue to hydrate, eat small light meals through out the day. Pt instructed to call office back if ctxs become every 5 mins for one hour, start to take her breath away and she can not talk through them, if they last at least 60 seconds, any decrease FM, LOF or VB. Pt verbalizes understanding. To [address] on-call to review.  Thank you

## 2021-09-15 NOTE — PROGRESS NOTES
Pt is a 28year old female admitted to TR1/TR1-A. Patient presents with:  R/o Labor: back pain since 0330 am today     Pt is  38w1d intra-uterine pregnancy. History obtained, consents signed. Oriented to room, staff, and plan of care.

## 2021-09-16 LAB
BASOPHILS # BLD AUTO: 0.05 X10(3) UL (ref 0–0.2)
BASOPHILS NFR BLD AUTO: 0.2 %
DEPRECATED RDW RBC AUTO: 46.2 FL (ref 35.1–46.3)
EOSINOPHIL # BLD AUTO: 0.02 X10(3) UL (ref 0–0.7)
EOSINOPHIL NFR BLD AUTO: 0.1 %
ERYTHROCYTE [DISTWIDTH] IN BLOOD BY AUTOMATED COUNT: 13.5 % (ref 11–15)
HCT VFR BLD AUTO: 35.2 %
HGB BLD-MCNC: 11.8 G/DL
IMM GRANULOCYTES # BLD AUTO: 0.23 X10(3) UL (ref 0–1)
IMM GRANULOCYTES NFR BLD: 1.1 %
LYMPHOCYTES # BLD AUTO: 3 X10(3) UL (ref 1–4)
LYMPHOCYTES NFR BLD AUTO: 14 %
MCH RBC QN AUTO: 31.3 PG (ref 26–34)
MCHC RBC AUTO-ENTMCNC: 33.5 G/DL (ref 31–37)
MCV RBC AUTO: 93.4 FL
MONOCYTES # BLD AUTO: 1.56 X10(3) UL (ref 0.1–1)
MONOCYTES NFR BLD AUTO: 7.3 %
NEUTROPHILS # BLD AUTO: 16.53 X10 (3) UL (ref 1.5–7.7)
NEUTROPHILS # BLD AUTO: 16.53 X10(3) UL (ref 1.5–7.7)
NEUTROPHILS NFR BLD AUTO: 77.3 %
PLATELET # BLD AUTO: 346 10(3)UL (ref 150–450)
RBC # BLD AUTO: 3.77 X10(6)UL
WBC # BLD AUTO: 21.4 X10(3) UL (ref 4–11)

## 2021-09-16 RX ORDER — BISACODYL 10 MG
10 SUPPOSITORY, RECTAL RECTAL ONCE AS NEEDED
Status: DISCONTINUED | OUTPATIENT
Start: 2021-09-16 | End: 2021-09-17

## 2021-09-16 RX ORDER — DOCUSATE SODIUM 100 MG/1
100 CAPSULE, LIQUID FILLED ORAL
Status: DISCONTINUED | OUTPATIENT
Start: 2021-09-16 | End: 2021-09-17

## 2021-09-16 RX ORDER — ONDANSETRON 2 MG/ML
4 INJECTION INTRAMUSCULAR; INTRAVENOUS EVERY 6 HOURS PRN
Status: DISCONTINUED | OUTPATIENT
Start: 2021-09-16 | End: 2021-09-17

## 2021-09-16 RX ORDER — LOPERAMIDE HYDROCHLORIDE 2 MG/1
2 CAPSULE ORAL 4 TIMES DAILY PRN
Status: DISCONTINUED | OUTPATIENT
Start: 2021-09-16 | End: 2021-09-17

## 2021-09-16 RX ORDER — LOPERAMIDE HYDROCHLORIDE 2 MG/1
CAPSULE ORAL
Status: DISPENSED
Start: 2021-09-16 | End: 2021-09-16

## 2021-09-16 RX ORDER — AMMONIA INHALANTS 0.04 G/.3ML
0.3 INHALANT RESPIRATORY (INHALATION) AS NEEDED
Status: DISCONTINUED | OUTPATIENT
Start: 2021-09-16 | End: 2021-09-17

## 2021-09-16 RX ORDER — ACETAMINOPHEN 325 MG/1
650 TABLET ORAL EVERY 6 HOURS PRN
Status: DISCONTINUED | OUTPATIENT
Start: 2021-09-16 | End: 2021-09-17

## 2021-09-16 RX ORDER — SIMETHICONE 80 MG
80 TABLET,CHEWABLE ORAL 3 TIMES DAILY PRN
Status: DISCONTINUED | OUTPATIENT
Start: 2021-09-16 | End: 2021-09-17

## 2021-09-16 RX ORDER — DIAPER,BRIEF,INFANT-TODD,DISP
1 EACH MISCELLANEOUS EVERY 6 HOURS PRN
Status: DISCONTINUED | OUTPATIENT
Start: 2021-09-16 | End: 2021-09-17

## 2021-09-16 RX ORDER — IBUPROFEN 600 MG/1
600 TABLET ORAL EVERY 6 HOURS
Status: DISCONTINUED | OUTPATIENT
Start: 2021-09-16 | End: 2021-09-17

## 2021-09-16 NOTE — DISCHARGE SUMMARY
[facility] - [facility]    Discharge Summary    [facility] Patient Status:  Inpatient    1985 MRN [id number]   Location [address] [facility] Attending Irais Lindo MD   [facility] Day # 2       Admission date:  9/15/2021    Greg

## 2021-09-16 NOTE — ANESTHESIA POSTPROCEDURE EVALUATION
Patient: Virgil Slater    Procedure Summary     Date: 09/15/21 Room / Location:     Anesthesia Start: 2018 Anesthesia Stop: 1439    Procedure: LABOR ANALGESIA Diagnosis:     Scheduled Providers:  Anesthesiologist: Alex Tavarez MD    Anesthesia Type:

## 2021-09-16 NOTE — PROGRESS NOTES
[facility] - [facility]    OB/Gyne Post  Progress Note      Crenshawlillian Slater Patient Status:  Inpatient    1985 MRN [id number]   Location [facility] 3SE Attending Darnell Sims MD   Hosp Day # 1 PCP Unknown Pcp       Subjective     Goo 7.70 x10(3) uL    Lymphocyte Absolute 1.28 1.00 - 4.00 x10(3) uL    Monocyte Absolute 0.58 0.10 - 1.00 x10(3) uL    Eosinophil Absolute 0.00 0.00 - 0.70 x10(3) uL    Basophil Absolute 0.03 0.00 - 0.20 x10(3) uL    Immature Granulocyte Absolute 0.22 0.00 -

## 2021-09-16 NOTE — PLAN OF CARE
Problem: PAIN - ADULT  Goal: Verbalizes/displays adequate comfort level or patient's stated pain goal  Description: INTERVENTIONS:  - Encourage pt to monitor pain and request assistance  - Assess pain using appropriate pain scale  - Administer analgesics equipment as indicated. Ensure aseptic care of all intravenous lines and invasive tubes/drains.  - Obtain immunization and exposure to communicable diseases history.   Outcome: Progressing  Goal: Optimize infant feeding at the breast  Description: INTERVENT assistance until it is safe to breastfeed infant. Outcome: Progressing  Goal: Appropriate maternal -  bonding  Description: INTERVENTIONS:  - Assess caregiver- interactions. - Assess caregiver's emotional status and coping mechanisms.   - En

## 2021-09-16 NOTE — PROGRESS NOTES
Pt transferred to [facility] via wheelchair along with baby- both in stable condition.  Report given to  [employer]

## 2021-09-16 NOTE — L&D DELIVERY NOTE
[facility] - [facility]    Vaginal Delivery Note    Zora Cueva Patient Status:  Inpatient    1985 MRN [id number]   Location [address]  Attending Daniel Bishop MD   Hosp Day # 1 PCP Unknown Pcp     Delivery     Inf Verified    Quantitative Blood Loss (mL)  pending    Sally Bishop MD   9/16/2021  12:49 AM

## 2021-09-16 NOTE — ANESTHESIA PROCEDURE NOTES
Labor Analgesia  Performed by: Alex Tavarez MD  Authorized by: Alex Tavarez MD       General Information and Staff    Start Time:  9/15/2021 8:18 PM  End Time:  9/15/2021 8:27 PM  Anesthesiologist:  Alex Tavarez MD  Performed by:  Gloria Carty

## 2021-09-16 NOTE — PLAN OF CARE
Problem: ANXIETY  Goal: Will report anxiety at manageable levels  Description: INTERVENTIONS:  - Administer medication as ordered  - Teach and rehearse alternative coping skills  - Provide emotional support with 1:1 interaction with staff  Outcome: Progr precautions and use personal protective equipment as indicated. Ensure aseptic care of all intravenous lines and invasive tubes/drains.  - Obtain immunization and exposure to communicable diseases history.   Outcome: Progressing  Goal: Optimize infant feedi equipment/supplies, instructions, and assistance until it is safe to breastfeed infant. Outcome: Progressing  Goal: Appropriate maternal -  bonding  Description: INTERVENTIONS:  - Assess caregiver- interactions.   - Assess caregiver's emotion

## 2021-09-16 NOTE — ANESTHESIA PREPROCEDURE EVALUATION
Anesthesia PreOp Note    HPI:     Artie Johnston is a 28year old female who presents for preoperative consultation requested by: * No surgeons listed *    Date of Surgery: 9/15/2021    * No procedures listed *  Indication: * No pre-op diagnosis entered * Rachel Arrington MD  bupivacaine PF (MARCAINE) 0.25% injection, 20 mL, Epidural, Once, Aditi Weller MD  EPHEDrine sulfate (PF) 25 mg/5 ml injection 5 mg, 5 mg, Intravenous, PRN, Kirk Fernandez MD  Nalbuphine HCl (NUBAIN) injection 2.5 mg, 2.5 mg, I of Transportation (Non-Medical):  Not on file  Physical Activity:       Days of Exercise per Week: Not on file      Minutes of Exercise per Session: Not on file  Stress:       Feeling of Stress : Not on file  Social Connections:       Frequency of [employer] Continuous epidural and PCEA  Informed Consent Plan and Risks Discussed With:  Patient and spouse      I have informed Illene Haw of the nature of the anesthetic plan, benefits, risks including possible dental damage if relevant, major complications, and

## 2021-09-17 VITALS
HEART RATE: 83 BPM | TEMPERATURE: 98 F | RESPIRATION RATE: 16 BRPM | SYSTOLIC BLOOD PRESSURE: 104 MMHG | DIASTOLIC BLOOD PRESSURE: 69 MMHG | OXYGEN SATURATION: 97 %

## 2021-09-17 RX ORDER — IBUPROFEN 600 MG/1
600 TABLET ORAL EVERY 6 HOURS
Qty: 30 TABLET | Refills: 0 | Status: SHIPPED | OUTPATIENT
Start: 2021-09-17

## 2021-09-17 RX ORDER — PSEUDOEPHEDRINE HCL 30 MG
100 TABLET ORAL 2 TIMES DAILY PRN
Qty: 30 CAPSULE | Refills: 0 | Status: SHIPPED | OUTPATIENT
Start: 2021-09-17

## 2021-09-17 NOTE — PLAN OF CARE
Problem: ANXIETY  Goal: Will report anxiety at manageable levels  Description: INTERVENTIONS:  - Administer medication as ordered  - Teach and rehearse alternative coping skills  - Provide emotional support with 1:1 interaction with staff  Outcome: Compl precautions and use personal protective equipment as indicated. Ensure aseptic care of all intravenous lines and invasive tubes/drains.  - Obtain immunization and exposure to communicable diseases history.   Outcome: Completed  Goal: Optimize infant feeding equipment/supplies, instructions, and assistance until it is safe to breastfeed infant. Outcome: Completed  Goal: Appropriate maternal -  bonding  Description: INTERVENTIONS:  - Assess caregiver- interactions.   - Assess caregiver's emotional

## 2021-09-17 NOTE — PROGRESS NOTES
[facility]    OB/Gyne Post  Progress Note      Manny Cordon Patient Status:  Inpatient    1985 MRN [id number]   Location [facility] 3SE Attending Kylie Alcantar MD   Hosp Day # 2 PCP Unknown Pcp       Subjective     Goo

## 2021-09-20 ENCOUNTER — NST DOCUMENTATION (OUTPATIENT)
Dept: OBGYN CLINIC | Facility: CLINIC | Age: 36
End: 2021-09-20

## 2021-09-20 NOTE — NST
Nonstress Test   Patient: Karen Gardner    Gestation: 38w1d    Diagnosis from order: AMA (advanced maternal age) primigravida 33+, third trimester  Inpatient order, no diagnosis associated       NST:         NST DOCUMENTATION 9/13/2021   Variability 6-25 BPM

## 2021-10-08 ENCOUNTER — TELEPHONE (OUTPATIENT)
Dept: OBGYN UNIT | Facility: HOSPITAL | Age: 36
End: 2021-10-08

## 2021-10-11 ENCOUNTER — TELEPHONE (OUTPATIENT)
Dept: OBGYN UNIT | Facility: HOSPITAL | Age: 36
End: 2021-10-11

## 2021-10-22 ENCOUNTER — IMMUNIZATION (OUTPATIENT)
Dept: LAB | Facility: HOSPITAL | Age: 36
End: 2021-10-22
Attending: EMERGENCY MEDICINE
Payer: COMMERCIAL

## 2021-10-22 DIAGNOSIS — Z23 NEED FOR VACCINATION: Primary | ICD-10-CM

## 2021-10-22 PROCEDURE — 0003A SARSCOV2 VAC 30MCG/0.3ML IM: CPT

## 2021-10-30 ENCOUNTER — POSTPARTUM (OUTPATIENT)
Dept: OBGYN CLINIC | Facility: CLINIC | Age: 36
End: 2021-10-30
Payer: COMMERCIAL

## 2021-10-30 VITALS
DIASTOLIC BLOOD PRESSURE: 75 MMHG | SYSTOLIC BLOOD PRESSURE: 119 MMHG | WEIGHT: 141 LBS | HEART RATE: 76 BPM | BODY MASS INDEX: 25 KG/M2

## 2021-10-30 PROCEDURE — 3078F DIAST BP <80 MM HG: CPT | Performed by: OBSTETRICS & GYNECOLOGY

## 2021-10-30 PROCEDURE — 3074F SYST BP LT 130 MM HG: CPT | Performed by: OBSTETRICS & GYNECOLOGY

## 2021-10-30 NOTE — PROGRESS NOTES
BEL Daniel is a 28year old female  here for 6 week post-partum visit. Patient delivered a  female infant on 9/15/2021. Patient desires minipill  for contraception. Patient is breast feeding.    Patient denies symptoms of depression, [employer] EXAM  Blood pressure 119/75, pulse 76, weight 141 lb (64 kg), last menstrual period 12/22/2020, currently breastfeeding.   General:  Well nourished, well developed woman in no acute distress  Abdomen:  soft, nontender, no masses  External Genitalia: normal

## 2021-11-03 ENCOUNTER — PATIENT MESSAGE (OUTPATIENT)
Dept: OBGYN CLINIC | Facility: CLINIC | Age: 36
End: 2021-11-03

## 2021-11-03 RX ORDER — ACETAMINOPHEN AND CODEINE PHOSPHATE 120; 12 MG/5ML; MG/5ML
0.35 SOLUTION ORAL DAILY
Qty: 84 TABLET | Refills: 1 | Status: SHIPPED | OUTPATIENT
Start: 2021-11-03

## 2021-11-03 NOTE — TELEPHONE ENCOUNTER
Regarding: Birth control   ----- Message from Caren Frye RN sent at 11/3/2021  9:16 AM CDT -----       ----- Message from Linton Moritz to Chantal Londono MD sent at 11/3/2021  8:52 AM -----   Hi Dr. Ravi Cadena,     I haven’t received a call that my birth c

## 2021-11-03 NOTE — TELEPHONE ENCOUNTER
From: Rambo Dodge  To: Venus Iglesias. MD Edna  Sent: 11/3/2021 8:52 AM CDT  Subject: Birth control     Hi Dr. Helena Peng,     I haven’t received a call that my birth control was ready to be picked up from the pharmacy.  I was just checking to see if it was sent in?

## 2021-11-03 NOTE — TELEPHONE ENCOUNTER
Pt seen for PP exam with CAP on 10/30/2021, discussed OCP mini pill since breastfeeding. No RX noted to be sent to pharmacy.      Notes indicate pt to return in March for annual.     To CAP to review, if ok to send OCP minipill until March when pts annual i

## 2021-11-09 ENCOUNTER — NURSE ONLY (OUTPATIENT)
Dept: LACTATION | Facility: HOSPITAL | Age: 36
End: 2021-11-09
Attending: OBSTETRICS & GYNECOLOGY
Payer: COMMERCIAL

## 2021-11-09 DIAGNOSIS — O92.79 DISORDER OF LACTATION, POSTPARTUM CONDITION OR COMPLICATION: ICD-10-CM

## 2021-11-09 PROCEDURE — 99213 OFFICE O/P EST LOW 20 MIN: CPT

## 2021-11-09 NOTE — PROGRESS NOTES
Situation  Infant is not latching to breast, mom is pumping and feeding EBM and ABM by bottle. Mom reports drop in milk supply. Pumping and collecting 15-30mls per session, pumping approx 5x a day. Noticed drop in supply a few weeks ago.  Recently started \

## 2021-11-09 NOTE — PATIENT INSTRUCTIONS
Increasing Milk Production Using a Breast Pump       Kangaroo mother care: Snuggle with your baby in skin to skin contact. This helps to wake a sleepy baby and increases your milk supply. Massage your breasts before nursing or pumping.   Practice r contact the lactation department or your doctor. Ways to help your milk let down (flow) to the pump:   • Massage your breasts for a few minutes prior to pumping and massage again if the milk flow slows down during the pumping session.    • Hand expressio of your current milk supply and your infant’s transfer of breastmilk is important prior to using galactogues. Review of your history and treatment of any medical conditions by your physician is may also be necessary.     Here are some suggestions:    Yuki Cerda per week for the first 3 months. If expressed breast milk or formula supplements are needed:  • Consider using Supplemental Nursing System (SNS) at the breast and offer your own breastmilk before offering formula.   • Continue to pump both breasts for 10

## 2022-02-14 ENCOUNTER — TELEPHONE (OUTPATIENT)
Dept: OBGYN CLINIC | Facility: CLINIC | Age: 37
End: 2022-02-14

## 2022-02-14 RX ORDER — DESOGESTREL AND ETHINYL ESTRADIOL 0.15-0.03
1 KIT ORAL DAILY
Qty: 28 TABLET | Refills: 11 | Status: CANCELLED | OUTPATIENT
Start: 2022-02-14 | End: 2023-02-14

## 2022-04-13 ENCOUNTER — PATIENT MESSAGE (OUTPATIENT)
Dept: OBGYN CLINIC | Facility: CLINIC | Age: 37
End: 2022-04-13

## 2022-04-13 RX ORDER — DESOGESTREL AND ETHINYL ESTRADIOL 0.15-0.03
1 KIT ORAL DAILY
Qty: 28 TABLET | Refills: 0 | Status: SHIPPED | OUTPATIENT
Start: 2022-04-13

## 2022-04-13 NOTE — TELEPHONE ENCOUNTER
From: Ceci Arias  To: Oriana Amato DO  Sent: 4/13/2022 8:26 AM CDT  Subject: Birth control question     I have an upcoming appointment with you on 5/6 for my yearly pap. Malorie Hall been on the mini pill birth control since November. Christiano had horrible feelings of anxiousness while on the mini pill. Christiano read that progestin only pills may have this side effect. I am no longer breast feeding. Is there any way I can get on a regular birth control. I was on Reclipsen prior to getting pregnant and had great results with that. I need to get back to some sense of normalcy.      Thank you,   Yvrose Seat

## 2022-04-20 RX ORDER — ACETAMINOPHEN AND CODEINE PHOSPHATE 120; 12 MG/5ML; MG/5ML
0.35 SOLUTION ORAL DAILY
Qty: 28 TABLET | Refills: 0 | Status: SHIPPED | OUTPATIENT
Start: 2022-04-20 | End: 2023-05-24

## 2022-05-06 ENCOUNTER — OFFICE VISIT (OUTPATIENT)
Dept: OBGYN CLINIC | Facility: CLINIC | Age: 37
End: 2022-05-06
Payer: COMMERCIAL

## 2022-05-06 VITALS
BODY MASS INDEX: 24 KG/M2 | HEART RATE: 84 BPM | DIASTOLIC BLOOD PRESSURE: 81 MMHG | SYSTOLIC BLOOD PRESSURE: 121 MMHG | WEIGHT: 135.38 LBS

## 2022-05-06 DIAGNOSIS — Z76.0 MEDICATION REFILL: ICD-10-CM

## 2022-05-06 DIAGNOSIS — Z01.419 WELL WOMAN EXAM: Primary | ICD-10-CM

## 2022-05-06 PROCEDURE — 3074F SYST BP LT 130 MM HG: CPT | Performed by: OBSTETRICS & GYNECOLOGY

## 2022-05-06 PROCEDURE — 3079F DIAST BP 80-89 MM HG: CPT | Performed by: OBSTETRICS & GYNECOLOGY

## 2022-05-06 PROCEDURE — 99395 PREV VISIT EST AGE 18-39: CPT | Performed by: OBSTETRICS & GYNECOLOGY

## 2022-05-06 RX ORDER — DESOGESTREL AND ETHINYL ESTRADIOL 0.15-0.03
1 KIT ORAL DAILY
Qty: 3 EACH | Refills: 3 | Status: SHIPPED | OUTPATIENT
Start: 2022-05-06

## 2022-05-09 LAB — HPV I/H RISK 1 DNA SPEC QL NAA+PROBE: NEGATIVE

## 2023-05-24 ENCOUNTER — OFFICE VISIT (OUTPATIENT)
Dept: OBGYN CLINIC | Facility: CLINIC | Age: 38
End: 2023-05-24

## 2023-05-24 VITALS
HEART RATE: 83 BPM | BODY MASS INDEX: 22 KG/M2 | DIASTOLIC BLOOD PRESSURE: 83 MMHG | WEIGHT: 125 LBS | SYSTOLIC BLOOD PRESSURE: 124 MMHG

## 2023-05-24 DIAGNOSIS — Z01.419 WELL WOMAN EXAM: Primary | ICD-10-CM

## 2023-05-24 DIAGNOSIS — Z76.0 MEDICATION REFILL: ICD-10-CM

## 2023-05-24 DIAGNOSIS — Z63.8 STRESS DUE TO FAMILY TENSION: ICD-10-CM

## 2023-05-24 PROCEDURE — 3074F SYST BP LT 130 MM HG: CPT | Performed by: OBSTETRICS & GYNECOLOGY

## 2023-05-24 PROCEDURE — 3079F DIAST BP 80-89 MM HG: CPT | Performed by: OBSTETRICS & GYNECOLOGY

## 2023-05-24 PROCEDURE — 99395 PREV VISIT EST AGE 18-39: CPT | Performed by: OBSTETRICS & GYNECOLOGY

## 2023-05-24 RX ORDER — DESOGESTREL AND ETHINYL ESTRADIOL 0.15-0.03
1 KIT ORAL DAILY
Qty: 3 EACH | Refills: 3 | Status: SHIPPED | OUTPATIENT
Start: 2023-05-24

## 2023-05-24 RX ORDER — TOBRAMYCIN AND DEXAMETHASONE 3; 1 MG/ML; MG/ML
1 SUSPENSION/ DROPS OPHTHALMIC 4 TIMES DAILY
COMMUNITY
Start: 2023-05-11

## 2023-05-24 SDOH — SOCIAL STABILITY - SOCIAL INSECURITY: OTHER SPECIFIED PROBLEMS RELATED TO PRIMARY SUPPORT GROUP: Z63.8

## 2024-01-12 ENCOUNTER — TELEMEDICINE (OUTPATIENT)
Dept: TELEHEALTH | Age: 39
End: 2024-01-12
Payer: COMMERCIAL

## 2024-01-12 DIAGNOSIS — R21 RASH: Primary | ICD-10-CM

## 2024-01-12 PROCEDURE — 99203 OFFICE O/P NEW LOW 30 MIN: CPT | Performed by: NURSE PRACTITIONER

## 2024-01-12 NOTE — PROGRESS NOTES
Virtual/Telephone Check-In    Uma Umaña verbally consents to a Virtual/Telephone Check-In service on 01/12/24.  Patient has been referred to the [facility] website at [email or web address] to review the yearly Consent to Treat document.  Patient understands and accepts financial responsibility for any deductible, co-insurance and/or co-pays associated with this service.       Telehealth Verbal Consent   I conducted a telehealth visit with Uma Umaña today, 01/12/24, which was completed using two-way, real-time interactive audio and video communication. This has been done in good bharat to provide continuity of care in the best interest of the provider-patient relationship, due to the COVID - public health crisis/national emergency where restrictions of face-to-face office visits are ongoing. Every conscious effort was taken to allow for sufficient and adequate time to complete the visit.  The patient was made aware of the limitations of the telehealth visit, including treatment limitations as no physical exam could be performed.  The patient was advised to call 911 or to go to the ER in case there was an emergency.  The patient was also advised of the potential privacy & security concerns related to the telehealth platform.   The patient was made aware of where to find [facility]'s notice of privacy practices, telehealth consent form and other related consent forms and documents.  which are located on the [facility] website. The patient verbally agreed to telehealth consent form, related consents and the risks discussed.    Lastly, the patient confirmed that they were in [place].   Included in this visit, time may have been spent reviewing labs, medications, radiology tests and decision making. Appropriate medical decision-making and tests are ordered as detailed in the plan of care above.  Coding/billing information is submitted for this visit based on complexity of care and/or time spent for the visit.    CHIEF COMPLAINT:      Chief Complaint   Patient presents with    Rash       HPI:   Uma Umaña is a 38 year old female who presents for a video visit.  Patient reports rash to hands and feet.  Per patient rash started in early Jan.   Reports started with a few small painful areas on palms, some below base of fingers.  Yesterday had 5 spots on rt hand; today there are 2.  Had a few on left hand but those have nearly resolved today.  Lesions feel sensitive/mildly tender.    Now has a few on edges of her feet.  They are mildly red and slightly raised; slight itching.   Lesions have not looked like blisters; no open lesions.    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  Uses lotion.   Pt's 2 yr old dtr had a fever around New Years but no other sx.    Exposure: no exposure to new skin/laundry products, medications, food, or chemicals.  No recent viral illness or infection.  Pertinent negatives include no fever, rash drainage, vomiting, congestion, rhinorrhea, facial or throat swelling, cough, SOB,  diarrhea, vomiting, sore throat, or joint pain.      Current Outpatient Medications   Medication Sig Dispense Refill    tobramycin-dexamethasone 0.3-0.1 % Ophthalmic Suspension Place 1 drop into the right eye 4 (four) times daily. (Patient not taking: Reported on 5/24/2023)      Desogestrel-Ethinyl Estradiol (RECLIPSEN) 0.15-30 MG-MCG Oral Tab Take 1 tablet by mouth daily. 3 each 3    docusate sodium 100 MG Oral Cap Take 100 mg by mouth 2 (two) times daily as needed for constipation. 30 capsule 0    ibuprofen 600 MG Oral Tab Take 1 tablet (600 mg total) by mouth every 6 (six) hours. 30 tablet 0    prenatal multivitamin plus DHA 27-0.8-228 MG Oral Cap Take 1 capsule by mouth daily. (Patient not taking: Reported on 5/6/2022)        Past Medical History:   Diagnosis Date    Human papilloma virus infection       Past Surgical History:   Procedure Laterality Date    COLPOSCOPY, CERVIX W/UPPER ADJACENT VAGINA; W/BIOPSY(S), CERVIX            Social History     Socioeconomic History    Marital status:    Tobacco Use    Smoking status: Former     Types: Cigarettes     Quit date: 2009     Years since quittin.8    Smokeless tobacco: Never    Tobacco comments:     Smoked socially.     Substance and Sexual Activity    Alcohol use: Yes     Comment: Socially. Stopped when found out was pregnant     Drug use: Never         REVIEW OF SYSTEMS:   GENERAL: normal appetite  SKIN: see HPI  HEENT: See HPI  LUNGS: denies shortness of breath or wheezing, See HPI  CARDIOVASCULAR: denies chest pain or palpitations   GI: denies N/V/C or abdominal pain  NEURO: Denies headaches    EXAM:   General: Alert, Well-appearing, and In no acute distress  Respiratory:   Speaking in full sentences comfortably  Normal work of breathing  No cough during visit  Head: Normocephalic  Eyes: Conjunctiva clear  Nose: No obvious nasal discharge.  Skin: Small erythematous slightly raised lesions on hands - visualized one on finger of rt hand.  Feet with a few red raised lesions on edged of feet.    Mood: Affect appropriate    ASSESSMENT AND PLAN:   Uma Umaña is a 38 year old female who presents with symptoms that are consistent with    ASSESSMENT/PLAN:     1. Rash  Discussed possible HFMD or sensitivity reaction.  Rash developed after dtr has fever. Dtr attends .   Will trial an antihistamine for itching for possible sensitivity reactions  Discussed prevention of spread for possible HFMD  Pt off work x 3 days; afebrile; no open lesions  To f/u in person if no improvement in 2-3 days or sooner for new/worsening sx  The patient indicates understanding of these issues and agrees to the plan.      Face to face time spent on Video Visit: 11:20 min  Total Time spent on visit including reviewing history, ordering labs/medication, patient examination and education: 13 min

## 2024-01-12 NOTE — PATIENT INSTRUCTIONS
Trial an antihistamine such as claritin, zyrtec, or allegra for itching  Follow up in person if no improvement in 2-3 days or sooner for new or worsening symptoms

## 2024-05-11 RX ORDER — DESOGESTREL AND ETHINYL ESTRADIOL 0.15-0.03
1 KIT ORAL DAILY
Qty: 28 TABLET | Refills: 0 | Status: SHIPPED | OUTPATIENT
Start: 2024-05-11

## 2024-05-21 ENCOUNTER — LAB ENCOUNTER (OUTPATIENT)
Dept: LAB | Facility: HOSPITAL | Age: 39
End: 2024-05-21
Attending: OBSTETRICS & GYNECOLOGY

## 2024-05-21 ENCOUNTER — OFFICE VISIT (OUTPATIENT)
Dept: OBGYN CLINIC | Facility: CLINIC | Age: 39
End: 2024-05-21
Payer: COMMERCIAL

## 2024-05-21 VITALS
HEART RATE: 93 BPM | BODY MASS INDEX: 22.68 KG/M2 | WEIGHT: 128 LBS | DIASTOLIC BLOOD PRESSURE: 79 MMHG | HEIGHT: 63 IN | SYSTOLIC BLOOD PRESSURE: 120 MMHG

## 2024-05-21 DIAGNOSIS — N64.52 NIPPLE DISCHARGE: ICD-10-CM

## 2024-05-21 DIAGNOSIS — Z31.9 PATIENT DESIRES PREGNANCY: ICD-10-CM

## 2024-05-21 DIAGNOSIS — Z01.419 WELL WOMAN EXAM: Primary | ICD-10-CM

## 2024-05-21 LAB
HCG SERPL QL: NEGATIVE
PROLACTIN SERPL-MCNC: 16.5 NG/ML
TSI SER-ACNC: 1.71 MIU/ML (ref 0.55–4.78)

## 2024-05-21 PROCEDURE — 36415 COLL VENOUS BLD VENIPUNCTURE: CPT

## 2024-05-21 PROCEDURE — 99395 PREV VISIT EST AGE 18-39: CPT | Performed by: OBSTETRICS & GYNECOLOGY

## 2024-05-21 PROCEDURE — 84146 ASSAY OF PROLACTIN: CPT

## 2024-05-21 PROCEDURE — 3008F BODY MASS INDEX DOCD: CPT | Performed by: OBSTETRICS & GYNECOLOGY

## 2024-05-21 PROCEDURE — 84703 CHORIONIC GONADOTROPIN ASSAY: CPT

## 2024-05-21 PROCEDURE — 84443 ASSAY THYROID STIM HORMONE: CPT

## 2024-05-21 PROCEDURE — 3078F DIAST BP <80 MM HG: CPT | Performed by: OBSTETRICS & GYNECOLOGY

## 2024-05-21 PROCEDURE — 3074F SYST BP LT 130 MM HG: CPT | Performed by: OBSTETRICS & GYNECOLOGY

## 2024-05-22 ENCOUNTER — HOSPITAL ENCOUNTER (OUTPATIENT)
Dept: ULTRASOUND IMAGING | Facility: HOSPITAL | Age: 39
Discharge: HOME | End: 2024-05-22
Attending: OBSTETRICS & GYNECOLOGY

## 2024-05-22 ENCOUNTER — HOSPITAL ENCOUNTER (OUTPATIENT)
Dept: MAMMOGRAPHY | Facility: HOSPITAL | Age: 39
Discharge: HOME | End: 2024-05-22
Attending: OBSTETRICS & GYNECOLOGY

## 2024-05-22 DIAGNOSIS — N64.52 NIPPLE DISCHARGE: ICD-10-CM

## 2024-05-22 PROCEDURE — 77066 DX MAMMO INCL CAD BI: CPT | Performed by: OBSTETRICS & GYNECOLOGY

## 2024-05-22 PROCEDURE — 76642 ULTRASOUND BREAST LIMITED: CPT | Performed by: OBSTETRICS & GYNECOLOGY

## 2024-05-22 PROCEDURE — 77062 BREAST TOMOSYNTHESIS BI: CPT | Performed by: OBSTETRICS & GYNECOLOGY

## 2024-05-23 ENCOUNTER — PATIENT MESSAGE (OUTPATIENT)
Dept: OBGYN CLINIC | Facility: CLINIC | Age: 39
End: 2024-05-23

## 2024-05-23 NOTE — TELEPHONE ENCOUNTER
From: Uma Umaña  To: Emanuel Zurita  Sent: 5/23/2024 11:27 AM CDT  Subject: Next Steps    Hi Dr. MOSLEY, Just wondering what the next steps are after the mammo?

## 2024-05-23 NOTE — TELEPHONE ENCOUNTER
Patient seen by Dr. Zurita on 5/21 for nipple discharge. Completed Mammogram and ultrasound. Requesting Dr. Zurita recommendations.    To Dr. Zurita, please advise. Thank you.    Consent (Spinal Accessory)/Introductory Paragraph: The rationale for Mohs was explained to the patient and consent was obtained. The risks, benefits and alternatives to therapy were discussed in detail. Specifically, the risks of damage to the spinal accessory nerve, infection, scarring, bleeding, prolonged wound healing, incomplete removal, allergy to anesthesia, and recurrence were addressed. Prior to the procedure, the treatment site was clearly identified and confirmed by the patient. All components of Universal Protocol/PAUSE Rule completed.

## 2024-06-05 NOTE — PROGRESS NOTES
Chief Complaint   Patient presents with    Gyn Exam     ANNUAL EXAM         HPI:  The patient is a 37 yo F here for WWE.  Trying to conceive since January.  Monthly cycles q30-32d with 5d flow .  35 yo . C/o right nipple dc recently.  Clear.  No pain.  No masses or skin changes.  Patient did breastfeeding x 5 mo with last baby.      Patient's last menstrual period was 2024 (approximate).        Latest Ref Rng & Units 2022     4:10 PM   RECENT PAP RESULTS   Thinprep Pap  Negative for intraepithelial lesion or malignancy.    HPV Negative Negative         Hx Prior Abnormal Pap: Yes  Pap Date: 22  Pap Result Notes: NEG PAP / NEG HPV      Chaperone: declines      Depression Screening (PHQ-2/PHQ-9): Over the LAST 2 WEEKS   Little interest or pleasure in doing things: Not at all    Feeling down, depressed, or hopeless: Not at all    PHQ-2 SCORE: 0          Reviewed medical and surgical history below       OBSTETRICS HISTORY:  OB History    Para Term  AB Living   1 1 1 0 0 1   SAB IAB Ectopic Multiple Live Births   0 0 0 0 1       GYNE HISTORY:  History   Sexual Activity    Sexual activity: Not on file            MEDICAL HISTORY:  Past Medical History:    Human papilloma virus infection       SURGICAL HISTORY:  Past Surgical History:   Procedure Laterality Date    Colposcopy, cervix w/upper adjacent vagina; w/biopsy(s), cervix         SOCIAL HISTORY:  Social History     Socioeconomic History    Marital status:      Spouse name: Not on file    Number of children: Not on file    Years of education: Not on file    Highest education level: Not on file   Occupational History    Not on file   Tobacco Use    Smoking status: Former     Current packs/day: 0.00     Types: Cigarettes     Quit date: 2009     Years since quitting: 15.2    Smokeless tobacco: Never    Tobacco comments:     Smoked socially.     Substance and Sexual Activity    Alcohol use: Yes     Comment: Socially. Stopped  when found out was pregnant     Drug use: Never    Sexual activity: Not on file   Other Topics Concern    Not on file   Social History Narrative    Not on file     Social Determinants of Health     Financial Resource Strain: Not on file   Food Insecurity: Not on file   Transportation Needs: Not on file   Physical Activity: Not on file   Stress: Not on file   Social Connections: Not on file   Housing Stability: Not on file       FAMILY HISTORY:  Family History   Problem Relation Age of Onset    Heart Attack Father     Prostate Cancer Father     Hypertension Mother     Lipids Mother     Other (Non-hodgkins lymphoma) Mother     Other (Parkinsons) Maternal Grandmother     Heart Disease Paternal Grandmother     Heart Disease Paternal Grandfather        MEDICATIONS:    Current Outpatient Medications:     Desogestrel-Ethinyl Estradiol (APRI) 0.15-30 MG-MCG Oral Tab, TAKE 1 TABLET BY MOUTH DAILY, Disp: 28 tablet, Rfl: 0    tobramycin-dexamethasone 0.3-0.1 % Ophthalmic Suspension, Place 1 drop into the right eye 4 (four) times daily. (Patient not taking: Reported on 5/24/2023), Disp: , Rfl:     docusate sodium 100 MG Oral Cap, Take 100 mg by mouth 2 (two) times daily as needed for constipation., Disp: 30 capsule, Rfl: 0    ibuprofen 600 MG Oral Tab, Take 1 tablet (600 mg total) by mouth every 6 (six) hours., Disp: 30 tablet, Rfl: 0    prenatal multivitamin plus DHA 27-0.8-228 MG Oral Cap, Take 1 capsule by mouth daily. (Patient not taking: Reported on 5/6/2022), Disp: , Rfl:     ALLERGIES:    Allergies   Allergen Reactions    Meperidine HIVES     Other reaction(s): Hives/Urticaria      Sulfa Antibiotics DIZZINESS     Other reaction(s): Other (See Comments)      Sulfamethoxazole W/Trimethoprim DIZZINESS     Other reaction(s): Dizziness           Review of Systems:  Constitutional:  Denies fevers and chills   Cardiovascular:  denies chest pain or palpitations  Respiratory:  denies shortness of breath  Gastrointestinal:  denies  heartburn, abdominal pain, diarrhea or constipation  Genitourinary:  denies dysuria, incontinence, abnormal vaginal discharge, vaginal itching  Musculoskeletal:  denies back pain.  Skin/Breast:  Denies any breast pain, lumps, or discharge.   Neurological:  denies headaches, extremity weakness  Psychiatric: denies depression or anxiety.      Vitals:    05/21/24 1411   BP: 120/79   Pulse: 93       PHYSICAL EXAM:   Constitutional: well developed, well nourished  Head/Face: normocephalic  Neck/Thyroid: thyroid symmetric, no thyromegaly, no nodules, no adenopathy  Heart: Regular rate and rhythm   Lungs: clear to ascultation bilaterally   Lymphatic:no abnormal supraclavicular or axillary adenopathy is noted  Breast: normal without palpable masses, tenderness, asymmetry, nipple retraction or skin changes.  Scant thin b/l nipple dc noted  Abdomen:  soft, nontender, nondistended, no masses  Skin/Hair: no unusual rashes or bruises  Extremities: no edema, no cyanosis  Psychiatric: Appropriate mood and affect    Pelvic Exam:  External Genitalia: normal appearance, hair distribution, and no lesions  Urethral Meatus:  normal in size, location, without lesions and prolapse  Bladder:  No fullness, masses or tenderness  Vagina:  Normal appearance without lesions, no abnormal discharge  Cervix:  Normal without tenderness on motion  Uterus: normal in size, contour, position, mobility, without tenderness  Adnexa: normal without masses or tenderness  Perineum: normal    Assessment/Plan:  Uma was seen today for gyn exam.    Diagnoses and all orders for this visit:    Well woman exam    Nipple discharge  -     Prolactin; Future  -     HCG, Beta Subunit, Qual; Future  -     TSH Assay, Thyroid Stim Hormone; Future  -     Cancel: Mammo Diagnostic BILATERAL; Future    Patient desires pregnancy        WWE:   Reviewed ASCCP guidelines with the patient -- Pap UTD  Trying to conceive: discussed cycle tracking, OPKs and PNVs  Breast Health:      Mammo rx'ed due to nipple dc; will also check hormones as noted above  Encouraged monthly self breast exams with the patient   Discussed diet, exercise, MVIs with Vit D  Follow up in 1 yr for [facility]

## 2024-10-28 ENCOUNTER — TELEPHONE (OUTPATIENT)
Dept: OBGYN CLINIC | Facility: CLINIC | Age: 39
End: 2024-10-28

## 2024-10-28 NOTE — TELEPHONE ENCOUNTER
Pt reports +HPT and LMP of 9/25. Pt stated her cycles are every 28-33 days. Pt stated her mike is showing that her LMP was in August, but she is \"pretty sure\" she had a period in September that lasted 5-7 days. Pt stated that her cycles were every 28-33 days. Pt is on pnv. Pt delivered with our practice in 2021 and is familiar with seeing male and female MDs. Pt scheduled OBN for 11/13.

## 2024-11-11 ENCOUNTER — PATIENT MESSAGE (OUTPATIENT)
Dept: OBGYN CLINIC | Facility: CLINIC | Age: 39
End: 2024-11-11

## 2024-11-12 ENCOUNTER — TELEPHONE (OUTPATIENT)
Dept: OBGYN CLINIC | Facility: CLINIC | Age: 39
End: 2024-11-12

## 2024-11-12 NOTE — TELEPHONE ENCOUNTER
Patient is concerned with some significant discharge she had last night. She sent a photo in [place]. Please call to discuss.

## 2024-11-12 NOTE — TELEPHONE ENCOUNTER
6w6d based on last menstrual period of 9/25. Patient MC and calling regarding mucus discharge noted yesterday, only once.  Informed that this is a normal discharge. (Picture in MC message). We dicussed that with increase in pregnancy hormones can see an increase in thicker discharge. Denies any itching, burning or odor. No spotting, bleeding or cramping. Patient reassured. Has OB Nurse Education tomorrow.     To Dr. Zurita on-call, for review and sign-off. Thank you.

## 2024-11-13 ENCOUNTER — LAB ENCOUNTER (OUTPATIENT)
Dept: LAB | Facility: HOSPITAL | Age: 39
End: 2024-11-13
Attending: OBSTETRICS & GYNECOLOGY
Payer: COMMERCIAL

## 2024-11-13 ENCOUNTER — NURSE ONLY (OUTPATIENT)
Dept: OBGYN CLINIC | Facility: CLINIC | Age: 39
End: 2024-11-13

## 2024-11-13 DIAGNOSIS — Z34.81 ENCOUNTER FOR SUPERVISION OF OTHER NORMAL PREGNANCY IN FIRST TRIMESTER (HCC): Primary | ICD-10-CM

## 2024-11-13 DIAGNOSIS — Z34.81 ENCOUNTER FOR SUPERVISION OF OTHER NORMAL PREGNANCY IN FIRST TRIMESTER (HCC): ICD-10-CM

## 2024-11-13 LAB
ANTIBODY SCREEN: NEGATIVE
BASOPHILS # BLD AUTO: 0.04 X10(3) UL (ref 0–0.2)
BASOPHILS NFR BLD AUTO: 0.4 %
DEPRECATED RDW RBC AUTO: 47 FL (ref 35.1–46.3)
EOSINOPHIL # BLD AUTO: 0.09 X10(3) UL (ref 0–0.7)
EOSINOPHIL NFR BLD AUTO: 0.9 %
ERYTHROCYTE [DISTWIDTH] IN BLOOD BY AUTOMATED COUNT: 15.2 % (ref 11–15)
HBV SURFACE AG SER-ACNC: <0.1 [IU]/L
HBV SURFACE AG SERPL QL IA: NONREACTIVE
HCG SERPL QL: POSITIVE
HCT VFR BLD AUTO: 36.6 %
HCV AB SERPL QL IA: NONREACTIVE
HGB BLD-MCNC: 12.5 G/DL
IMM GRANULOCYTES # BLD AUTO: 0.11 X10(3) UL (ref 0–1)
IMM GRANULOCYTES NFR BLD: 1.1 %
LYMPHOCYTES # BLD AUTO: 2.55 X10(3) UL (ref 1–4)
LYMPHOCYTES NFR BLD AUTO: 25.2 %
MCH RBC QN AUTO: 29 PG (ref 26–34)
MCHC RBC AUTO-ENTMCNC: 34.2 G/DL (ref 31–37)
MCV RBC AUTO: 84.9 FL
MONOCYTES # BLD AUTO: 0.83 X10(3) UL (ref 0.1–1)
MONOCYTES NFR BLD AUTO: 8.2 %
NEUTROPHILS # BLD AUTO: 6.5 X10 (3) UL (ref 1.5–7.7)
NEUTROPHILS # BLD AUTO: 6.5 X10(3) UL (ref 1.5–7.7)
NEUTROPHILS NFR BLD AUTO: 64.2 %
PLATELET # BLD AUTO: 369 10(3)UL (ref 150–450)
RBC # BLD AUTO: 4.31 X10(6)UL
RH BLOOD TYPE: POSITIVE
RUBV IGG SER QL: POSITIVE
RUBV IGG SER-ACNC: 47 IU/ML (ref 10–?)
T PALLIDUM AB SER QL IA: NONREACTIVE
WBC # BLD AUTO: 10.1 X10(3) UL (ref 4–11)

## 2024-11-13 PROCEDURE — 36415 COLL VENOUS BLD VENIPUNCTURE: CPT

## 2024-11-13 PROCEDURE — 86780 TREPONEMA PALLIDUM: CPT

## 2024-11-13 PROCEDURE — 86850 RBC ANTIBODY SCREEN: CPT

## 2024-11-13 PROCEDURE — 84703 CHORIONIC GONADOTROPIN ASSAY: CPT

## 2024-11-13 PROCEDURE — 87340 HEPATITIS B SURFACE AG IA: CPT

## 2024-11-13 PROCEDURE — 86762 RUBELLA ANTIBODY: CPT

## 2024-11-13 PROCEDURE — 87389 HIV-1 AG W/HIV-1&-2 AB AG IA: CPT

## 2024-11-13 PROCEDURE — 86803 HEPATITIS C AB TEST: CPT

## 2024-11-13 PROCEDURE — 86900 BLOOD TYPING SEROLOGIC ABO: CPT

## 2024-11-13 PROCEDURE — 87086 URINE CULTURE/COLONY COUNT: CPT

## 2024-11-13 PROCEDURE — 85025 COMPLETE CBC W/AUTO DIFF WBC: CPT

## 2024-11-13 PROCEDURE — 86901 BLOOD TYPING SEROLOGIC RH(D): CPT

## 2024-11-13 NOTE — PROGRESS NOTES
Pt called for OBN appt today with no complaints. Pt informed again of both male and female providers and the need to rotate PN appt with all providers since OB on-call will be the one that delivers her.   1 hr gtt (AMA), Varicella, qual HCG and Normal PN labs ordered. Pt advised all labs must be completed and resulted prior to NPN appt. If labs are not completed and resulted the NPN appt will be cancelled.   Assisted pt with scheduling NPN appt with MD.       Height: 5 ft 3 in  Weight: 125 lb  BMI: 22.3    Partner's name is Mayur Umaña contact #[phone] ; race: White  Patients Occupation: Physical Therapist     MEDICAL HISTORY    Anemia No    Anesthetic complications No    Anxiety/Depression  No    Autoimmune Disorder No    Asthma  Yes Highschool-exercise induced-used inhaler   Cancer No    Diabetes  No    Gyne/breast Surgery Yes Colposcopy-2012   Heart Disease No    Hepatitis/Liver Disease  No    History of blood transfusion No    History of abnormal pap Yes Human papillomavirus-2012  normal since    Hypertension  No    Infertility  No    Kidney Disease/Frequent UTIs  No    Medication Allergies Yes Sulfa and Meperidine    Latex Allergies No    Food Allergies  No    Neurological Disorder/Epilepsy No    Operations/Hospitalizations No    TB exposure  No    Thyroid Dysfunction No    Trauma/Violence  No    Uterine Anomaly  No    Uterine Fibroids  No    Variocosities/DVTs No    Smoker Yes Social smoking in her early 20's.    Drug usage in prior year No    Alcohol Yes Social prior to pregnancy   Would you accept a blood transfusion? If no, are you a [facility]? Yes    No     Will accept blood and blood products        INFECTION HISTORY    Chlamydia No    Pt or partner have hx of Genital Herpes No    Gonorrhea No    Hepatitis B No    HIV No    HPV Yes 2012   MRSA No    Syphilis No    Tattoos No    Live with someone or Exposed to TB No    Rash or viral illness since LMP  No    Varicella Yes Chicken poxes   Pets  No        GENETICS SCREENING    Genetic Screening    Genetic Screening/Teratology Counseling- Includes patient, baby's father, or anyone in either family with:  Patient's age 35 years or older as of estimated date of delivery: Yes     Thalassemia (Italian, Greek, Mediterranean, or  background): MCV less than 80: No     Neural tube defect (Meningomyelocele, Spina bifida, or Anencephaly): No     Congenital heart defect: No     Down syndrome: No     Raza-Sachs (Ashkenazi Mormon, Cajun, Urdu Bartholomew): No     Canavan disease (Ashkenazi Mormon): No     Familial dysautonomia (Ashkenazi Mormon): No     Sickle cell disease or trait (): No     Hemophilia or other blood disorders: No     Muscular dystrophy: No    Cystic fibrosis: No     Bryant's chorea: No     Intellectual disability and/or autism: No     Other inherited genetic or chromosomal disorder: No     Maternal metabolic disorder (eg. Type 1 diabetes, PKU): No     Patient or baby's father had child with birth defects not listed above: No     Recurrent pregnancy loss, or a stillbirth: No     Medications (including supplements, vitamins, herbs, or OTC drugs)/illicit/recreational drugs/alcohol since last menstrual period: Yes     If yes, agent(s) and strength/dosage: Prenatal vitamins                  MISC    Infant vaccinations  Yes    Pt. Has answered NO 5P questions and has NO  risk factors.    Pt. Given What pregnant women need to know handout.

## 2024-11-15 ENCOUNTER — LAB ENCOUNTER (OUTPATIENT)
Dept: LAB | Facility: HOSPITAL | Age: 39
End: 2024-11-15
Attending: OBSTETRICS & GYNECOLOGY
Payer: COMMERCIAL

## 2024-11-15 DIAGNOSIS — Z34.81 ENCOUNTER FOR SUPERVISION OF OTHER NORMAL PREGNANCY IN FIRST TRIMESTER (HCC): ICD-10-CM

## 2024-11-15 LAB — GLUCOSE 1H P GLC SERPL-MCNC: 82 MG/DL

## 2024-11-15 PROCEDURE — 82950 GLUCOSE TEST: CPT

## 2024-11-15 PROCEDURE — 36415 COLL VENOUS BLD VENIPUNCTURE: CPT

## 2024-11-25 ENCOUNTER — TELEPHONE (OUTPATIENT)
Dept: OBGYN CLINIC | Facility: CLINIC | Age: 39
End: 2024-11-25

## 2024-11-25 ENCOUNTER — INITIAL PRENATAL (OUTPATIENT)
Dept: OBGYN CLINIC | Facility: CLINIC | Age: 39
End: 2024-11-25

## 2024-11-25 VITALS
DIASTOLIC BLOOD PRESSURE: 82 MMHG | BODY MASS INDEX: 24 KG/M2 | WEIGHT: 132.81 LBS | HEART RATE: 99 BPM | SYSTOLIC BLOOD PRESSURE: 117 MMHG

## 2024-11-25 DIAGNOSIS — Z34.91 ENCOUNTER FOR SUPERVISION OF NORMAL PREGNANCY IN FIRST TRIMESTER, UNSPECIFIED GRAVIDITY (HCC): Primary | ICD-10-CM

## 2024-11-25 DIAGNOSIS — O26.841 SIGNIFICANT DISCREPANCY BETWEEN UTERINE SIZE AND CLINICAL DATES, ANTEPARTUM, FIRST TRIMESTER (HCC): ICD-10-CM

## 2024-11-25 PROBLEM — Z34.90 PREGNANCY (HCC): Status: RESOLVED | Noted: 2021-09-15 | Resolved: 2024-11-25

## 2024-11-25 LAB
APPEARANCE: CLEAR
BILIRUBIN: NEGATIVE
GLUCOSE (URINE DIPSTICK): NEGATIVE MG/DL
KETONES (URINE DIPSTICK): NEGATIVE MG/DL
MULTISTIX LOT#: ABNORMAL NUMERIC
NITRITE, URINE: NEGATIVE
PH, URINE: 6 (ref 4.5–8)
PROTEIN (URINE DIPSTICK): NEGATIVE MG/DL
SPECIFIC GRAVITY: 1.01 (ref 1–1.03)
URINE-COLOR: YELLOW
UROBILINOGEN,SEMI-QN: 0.2 MG/DL (ref 0–1.9)

## 2024-11-25 PROCEDURE — 87661 TRICHOMONAS VAGINALIS AMPLIF: CPT | Performed by: OBSTETRICS & GYNECOLOGY

## 2024-11-25 PROCEDURE — 87491 CHLMYD TRACH DNA AMP PROBE: CPT | Performed by: OBSTETRICS & GYNECOLOGY

## 2024-11-25 PROCEDURE — 87591 N.GONORRHOEAE DNA AMP PROB: CPT | Performed by: OBSTETRICS & GYNECOLOGY

## 2024-11-25 NOTE — TELEPHONE ENCOUNTER
Patient came to  to schedule next return OB appointment for 4 weeks out the week of December 23. Patient states she is only available Monday the 23rd that week. No available appointments. Please advise

## 2024-11-25 NOTE — PROGRESS NOTES
Feeling well except HA in past 4 days. Sure FDLMP and cycles q 30-32. Office ultrasound 9d < dates. Formal ultrasound scheduled 11-27. Declines first trimester screen. Discussed AMA plan.

## 2024-11-26 LAB
C TRACH DNA SPEC QL NAA+PROBE: NEGATIVE
N GONORRHOEA DNA SPEC QL NAA+PROBE: NEGATIVE

## 2024-11-27 ENCOUNTER — HOSPITAL ENCOUNTER (OUTPATIENT)
Dept: ULTRASOUND IMAGING | Facility: HOSPITAL | Age: 39
Discharge: HOME | End: 2024-11-27
Attending: OBSTETRICS & GYNECOLOGY
Payer: COMMERCIAL

## 2024-11-27 DIAGNOSIS — O26.841 SIGNIFICANT DISCREPANCY BETWEEN UTERINE SIZE AND CLINICAL DATES, ANTEPARTUM, FIRST TRIMESTER (HCC): ICD-10-CM

## 2024-11-27 LAB — T VAGINALIS RRNA SPEC QL NAA+PROBE: NEGATIVE

## 2024-11-27 PROCEDURE — 76801 OB US < 14 WKS SINGLE FETUS: CPT | Performed by: OBSTETRICS & GYNECOLOGY

## 2024-12-02 ENCOUNTER — TELEPHONE (OUTPATIENT)
Dept: OBGYN CLINIC | Facility: CLINIC | Age: 39
End: 2024-12-02

## 2024-12-02 DIAGNOSIS — O09.521 AMA (ADVANCED MATERNAL AGE) MULTIGRAVIDA 35+, FIRST TRIMESTER (HCC): Primary | ICD-10-CM

## 2024-12-02 NOTE — TELEPHONE ENCOUNTER
----- Message from Keo Diaz sent at 12/2/2024  7:54 AM CST -----  Ultrasound = dates. Needs Maternal Fetal Medicine referral for consult / level 2 and growth for AMA. She declines genetics. Thanks.

## 2024-12-23 ENCOUNTER — ROUTINE PRENATAL (OUTPATIENT)
Dept: OBGYN CLINIC | Facility: CLINIC | Age: 39
End: 2024-12-23
Payer: COMMERCIAL

## 2024-12-23 VITALS
BODY MASS INDEX: 24 KG/M2 | DIASTOLIC BLOOD PRESSURE: 62 MMHG | HEART RATE: 60 BPM | WEIGHT: 133 LBS | SYSTOLIC BLOOD PRESSURE: 108 MMHG

## 2024-12-23 DIAGNOSIS — Z34.80 ENCOUNTER FOR SUPERVISION OF OTHER NORMAL PREGNANCY, UNSPECIFIED TRIMESTER (HCC): Primary | ICD-10-CM

## 2024-12-23 LAB
BILIRUBIN: NEGATIVE
GLUCOSE (URINE DIPSTICK): NEGATIVE MG/DL
KETONES (URINE DIPSTICK): NEGATIVE MG/DL
MULTISTIX LOT#: ABNORMAL NUMERIC
NITRITE, URINE: NEGATIVE
PH, URINE: 6.5 (ref 4.5–8)
PROTEIN (URINE DIPSTICK): NEGATIVE MG/DL
SPECIFIC GRAVITY: 1.01 (ref 1–1.03)
URINE-COLOR: ABNORMAL
UROBILINOGEN,SEMI-QN: 0.2 MG/DL (ref 0–1.9)

## 2024-12-23 PROCEDURE — 3078F DIAST BP <80 MM HG: CPT | Performed by: OBSTETRICS & GYNECOLOGY

## 2024-12-23 PROCEDURE — 3074F SYST BP LT 130 MM HG: CPT | Performed by: OBSTETRICS & GYNECOLOGY

## 2024-12-23 PROCEDURE — 81002 URINALYSIS NONAUTO W/O SCOPE: CPT | Performed by: OBSTETRICS & GYNECOLOGY

## 2024-12-28 NOTE — TELEPHONE ENCOUNTER
Last annual - 5/24/2023  Last pap - 5/6/2022, normal    Next annual is scheduled on 5/21/2024.  One month sent to pharmacy to cover until appointment.     English

## 2025-01-22 ENCOUNTER — ROUTINE PRENATAL (OUTPATIENT)
Dept: OBGYN CLINIC | Facility: CLINIC | Age: 40
End: 2025-01-22
Payer: COMMERCIAL

## 2025-01-22 VITALS
SYSTOLIC BLOOD PRESSURE: 108 MMHG | BODY MASS INDEX: 26 KG/M2 | WEIGHT: 145.38 LBS | HEART RATE: 92 BPM | DIASTOLIC BLOOD PRESSURE: 73 MMHG

## 2025-01-22 DIAGNOSIS — Z34.80 ENCOUNTER FOR SUPERVISION OF OTHER NORMAL PREGNANCY, UNSPECIFIED TRIMESTER (HCC): Primary | ICD-10-CM

## 2025-01-22 LAB
APPEARANCE: CLEAR
GLUCOSE (URINE DIPSTICK): NEGATIVE MG/DL
KETONES (URINE DIPSTICK): NEGATIVE MG/DL
MULTISTIX LOT#: ABNORMAL NUMERIC
NITRITE, URINE: NEGATIVE
PH, URINE: 5 (ref 4.5–8)
PROTEIN (URINE DIPSTICK): NEGATIVE MG/DL
SPECIFIC GRAVITY: 1 (ref 1–1.03)
URINE-COLOR: YELLOW
UROBILINOGEN,SEMI-QN: 0.2 MG/DL (ref 0–1.9)

## 2025-01-22 PROCEDURE — 3078F DIAST BP <80 MM HG: CPT | Performed by: OBSTETRICS & GYNECOLOGY

## 2025-01-22 PROCEDURE — 3074F SYST BP LT 130 MM HG: CPT | Performed by: OBSTETRICS & GYNECOLOGY

## 2025-01-22 PROCEDURE — 81002 URINALYSIS NONAUTO W/O SCOPE: CPT | Performed by: OBSTETRICS & GYNECOLOGY

## 2025-01-22 RX ORDER — LORATADINE 10 MG/1
10 TABLET ORAL DAILY
COMMUNITY

## 2025-02-12 ENCOUNTER — TELEPHONE (OUTPATIENT)
Dept: OBGYN CLINIC | Facility: CLINIC | Age: 40
End: 2025-02-12

## 2025-02-12 NOTE — TELEPHONE ENCOUNTER
20w0d. Assisted patient in rescheduling [facility] on 2/21 with Dr. Zurita. Patient aware he will be on-call this day and verbalized understanding. Patient appreciative.

## 2025-02-12 NOTE — TELEPHONE ENCOUNTER
Patient is seeking to reschedule prenatal appointment scheduled 2/17. No appointment available per request.

## 2025-02-14 ENCOUNTER — HOSPITAL ENCOUNTER (EMERGENCY)
Facility: HOSPITAL | Age: 40
Discharge: HOME | End: 2025-02-14
Attending: EMERGENCY MEDICINE
Payer: COMMERCIAL

## 2025-02-14 ENCOUNTER — APPOINTMENT (OUTPATIENT)
Dept: ULTRASOUND IMAGING | Facility: HOSPITAL | Age: 40
End: 2025-02-14
Attending: EMERGENCY MEDICINE
Payer: COMMERCIAL

## 2025-02-14 ENCOUNTER — TELEPHONE (OUTPATIENT)
Dept: OBGYN CLINIC | Facility: CLINIC | Age: 40
End: 2025-02-14

## 2025-02-14 VITALS
TEMPERATURE: 98 F | OXYGEN SATURATION: 98 % | SYSTOLIC BLOOD PRESSURE: 108 MMHG | WEIGHT: 150 LBS | DIASTOLIC BLOOD PRESSURE: 79 MMHG | RESPIRATION RATE: 10 BRPM | BODY MASS INDEX: 26.58 KG/M2 | HEART RATE: 90 BPM | HEIGHT: 63 IN

## 2025-02-14 DIAGNOSIS — R55 SYNCOPE, NEAR: Primary | ICD-10-CM

## 2025-02-14 DIAGNOSIS — Z3A.20 20 WEEKS GESTATION OF PREGNANCY (HCC): ICD-10-CM

## 2025-02-14 DIAGNOSIS — D64.9 ANEMIA, UNSPECIFIED TYPE: ICD-10-CM

## 2025-02-14 LAB
ANION GAP SERPL CALC-SCNC: 8 MMOL/L (ref 0–18)
ATRIAL RATE: 87 BPM
BASOPHILS # BLD AUTO: 0.02 X10(3) UL (ref 0–0.2)
BASOPHILS NFR BLD AUTO: 0.2 %
BILIRUB UR QL: NEGATIVE
BUN BLD-MCNC: 12 MG/DL (ref 9–23)
BUN/CREAT SERPL: 23.5 (ref 10–20)
CALCIUM BLD-MCNC: 8.4 MG/DL (ref 8.7–10.4)
CHLORIDE SERPL-SCNC: 107 MMOL/L (ref 98–112)
CLARITY UR: CLEAR
CO2 SERPL-SCNC: 23 MMOL/L (ref 21–32)
CREAT BLD-MCNC: 0.51 MG/DL
DEPRECATED RDW RBC AUTO: 46.2 FL (ref 35.1–46.3)
EGFRCR SERPLBLD CKD-EPI 2021: 122 ML/MIN/1.73M2 (ref 60–?)
EOSINOPHIL # BLD AUTO: 0.03 X10(3) UL (ref 0–0.7)
EOSINOPHIL NFR BLD AUTO: 0.3 %
ERYTHROCYTE [DISTWIDTH] IN BLOOD BY AUTOMATED COUNT: 14.7 % (ref 11–15)
GLUCOSE BLD-MCNC: 76 MG/DL (ref 70–99)
GLUCOSE UR-MCNC: NORMAL MG/DL
HCT VFR BLD AUTO: 30.3 %
HGB BLD-MCNC: 9.9 G/DL
HGB UR QL STRIP.AUTO: NEGATIVE
IMM GRANULOCYTES # BLD AUTO: 0.08 X10(3) UL (ref 0–1)
IMM GRANULOCYTES NFR BLD: 0.9 %
KETONES UR-MCNC: NEGATIVE MG/DL
LEUKOCYTE ESTERASE UR QL STRIP.AUTO: 250
LYMPHOCYTES # BLD AUTO: 1.18 X10(3) UL (ref 1–4)
LYMPHOCYTES NFR BLD AUTO: 13.5 %
MCH RBC QN AUTO: 28 PG (ref 26–34)
MCHC RBC AUTO-ENTMCNC: 32.7 G/DL (ref 31–37)
MCV RBC AUTO: 85.8 FL
MONOCYTES # BLD AUTO: 0.65 X10(3) UL (ref 0.1–1)
MONOCYTES NFR BLD AUTO: 7.5 %
NEUTROPHILS # BLD AUTO: 6.75 X10 (3) UL (ref 1.5–7.7)
NEUTROPHILS # BLD AUTO: 6.75 X10(3) UL (ref 1.5–7.7)
NEUTROPHILS NFR BLD AUTO: 77.6 %
NITRITE UR QL STRIP.AUTO: NEGATIVE
OSMOLALITY SERPL CALC.SUM OF ELEC: 285 MOSM/KG (ref 275–295)
P AXIS: 39 DEGREES
P-R INTERVAL: 122 MS
PH UR: 6.5 [PH] (ref 5–8)
PLATELET # BLD AUTO: 436 10(3)UL (ref 150–450)
POTASSIUM SERPL-SCNC: 3.7 MMOL/L (ref 3.5–5.1)
PROT UR-MCNC: NEGATIVE MG/DL
Q-T INTERVAL: 360 MS
QRS DURATION: 82 MS
QTC CALCULATION (BEZET): 433 MS
R AXIS: 72 DEGREES
RBC # BLD AUTO: 3.53 X10(6)UL
SODIUM SERPL-SCNC: 138 MMOL/L (ref 136–145)
SP GR UR STRIP: <1.005 (ref 1–1.03)
T AXIS: 35 DEGREES
TROPONIN I SERPL HS-MCNC: <3 NG/L
UROBILINOGEN UR STRIP-ACNC: NORMAL
VENTRICULAR RATE: 87 BPM
WBC # BLD AUTO: 8.7 X10(3) UL (ref 4–11)

## 2025-02-14 PROCEDURE — 99285 EMERGENCY DEPT VISIT HI MDM: CPT

## 2025-02-14 PROCEDURE — 80048 BASIC METABOLIC PNL TOTAL CA: CPT | Performed by: EMERGENCY MEDICINE

## 2025-02-14 PROCEDURE — 85025 COMPLETE CBC W/AUTO DIFF WBC: CPT | Performed by: EMERGENCY MEDICINE

## 2025-02-14 PROCEDURE — 96361 HYDRATE IV INFUSION ADD-ON: CPT

## 2025-02-14 PROCEDURE — 93010 ELECTROCARDIOGRAM REPORT: CPT

## 2025-02-14 PROCEDURE — 96360 HYDRATION IV INFUSION INIT: CPT

## 2025-02-14 PROCEDURE — 84484 ASSAY OF TROPONIN QUANT: CPT | Performed by: EMERGENCY MEDICINE

## 2025-02-14 PROCEDURE — 93970 EXTREMITY STUDY: CPT | Performed by: EMERGENCY MEDICINE

## 2025-02-14 PROCEDURE — 93005 ELECTROCARDIOGRAM TRACING: CPT

## 2025-02-14 PROCEDURE — 87086 URINE CULTURE/COLONY COUNT: CPT | Performed by: EMERGENCY MEDICINE

## 2025-02-14 PROCEDURE — 81001 URINALYSIS AUTO W/SCOPE: CPT | Performed by: EMERGENCY MEDICINE

## 2025-02-14 NOTE — TELEPHONE ENCOUNTER
Patient is 20w2d, reports she was at work and spontaneously started feeling light headed. Patient is a physcial therapist. Blood pressure was checked and it was 100/66 about 30 min ago. States she ate a granola bar and rested on her left side and no improvement in feeling light headed. States she feels off. Denies CP, shortness of breath and ill symptoms. Reports she had been having swelling in lower extremities but was fine this morning. Patient also wears compression stockings. Patient has arrived to the ER. Advised patient if she feels off and the light headedness is constant then she should be seen in the ER. Patient will follow up with clinic.

## 2025-02-14 NOTE — ED PROVIDER NOTES
Patient Seen in: [facility] Emergency Department      History     Chief Complaint   Patient presents with    Dizziness     Stated Complaint: dizziness; lightheadness; 20wks preg.    Subjective:   HPI      Patient presents to the emergency department complaining of dizziness.  She states that at 730 this morning she began having dizziness described as a lightheaded feeling as if she might pass out.  She works at a physical therapy office and they checked her blood pressure and found it to be 100/60.  She is G2, P0 approximately 20 weeks pregnant by date.  She denies headache, chest pain, shortness of breath.  She has had some lower extremity edema which is progressed over the last few weeks.    Objective:     No pertinent past medical history.            No pertinent past surgical history.              No pertinent social history.                Physical Exam     ED Triage Vitals [02/14/25 0825]   /75   Pulse 92   Resp 20   Temp 98 °F (36.7 °C)   Temp src Temporal   SpO2 99 %   O2 Device None (Room air)       Current Vitals:   Vital Signs  BP: 108/79  Pulse: 90  Resp: 10  Temp: 98 °F (36.7 °C)  Temp src: Temporal  MAP (mmHg): 89    Oxygen Therapy  SpO2: 98 %  O2 Device: None (Room air)        Physical Exam  Vitals and nursing note reviewed.   Constitutional:       General: She is not in acute distress.     Appearance: She is well-developed.   HENT:      Head: Normocephalic.      Nose: Nose normal.      Mouth/Throat:      Mouth: Mucous membranes are moist.   Eyes:      Conjunctiva/sclera: Conjunctivae normal.   Cardiovascular:      Rate and Rhythm: Normal rate and regular rhythm.      Heart sounds: No murmur heard.  Pulmonary:      Effort: Pulmonary effort is normal. No respiratory distress.      Breath sounds: Normal breath sounds.   Abdominal:      General: There is no distension.      Palpations: Abdomen is soft.      Tenderness: There is no abdominal tenderness.   Musculoskeletal:         General:  Swelling present. No tenderness. Normal range of motion.      Cervical back: Normal range of motion and neck supple.   Skin:     General: Skin is warm and dry.      Findings: No rash.   Neurological:      Mental Status: She is alert and oriented to person, place, and time.   Psychiatric:         Mood and Affect: Mood normal.             ED Course     Labs Reviewed   CBC WITH DIFFERENTIAL WITH PLATELET - Abnormal; Notable for the following components:       Result Value    RBC 3.53 (*)     HGB 9.9 (*)     HCT 30.3 (*)     All other components within normal limits   BASIC METABOLIC PANEL (8) - Abnormal; Notable for the following components:    Creatinine 0.51 (*)     BUN/CREA Ratio 23.5 (*)     Calcium, Total 8.4 (*)     All other components within normal limits   URINALYSIS, ROUTINE - Abnormal; Notable for the following components:    Spec Gravity <1.005 (*)     Leukocyte Esterase Urine 250 (*)     WBC Urine 6-10 (*)     Bacteria Urine 2+ (*)     Squamous Epi. Cells Few (*)     All other components within normal limits   TROPONIN I HIGH SENSITIVITY - Normal   RAINBOW DRAW LAVENDER   RAINBOW DRAW LIGHT GREEN   RAINBOW DRAW BLUE   URINE CULTURE, ROUTINE     EKG    Rate, intervals and axes as noted on EKG Report.  Rate: 87 bpm  Rhythm: Sinus Rhythm  Reading: Normal EKG                       MDM              Medical Decision Making  Differential diagnosis considered for anemia, electrolyte disturbance, dysrhythmia, dehydration.    Problems Addressed:  20 weeks gestation of pregnancy (HCC): acute illness or injury  Anemia, unspecified type: acute illness or injury  Syncope, near: acute illness or injury    Amount and/or Complexity of Data Reviewed  Labs: ordered. Decision-making details documented in ED Course.     Details: Hemoglobin is 9.9 down from 12.12 months ago.  Electrolytes normal.    Radiology: ordered and independent interpretation performed. Decision-making details documented in ED Course.     Details:  Ultrasound negative for DVT.  ECG/medicine tests: ordered and independent interpretation performed. Decision-making details documented in ED Course.  Discussion of management or test interpretation with external provider(s): Patient felt much better prior to my evaluation and feels 100% better after IV fluids.  Discussed with patient's OB doctor.  Will not treat urine white blood cells as patient has no urinary complaints.  Instructed on compression stockings and aggressive hydration and frequent small meals.  Understands return for worse condition.        Disposition and Plan     Clinical Impression:  1. Syncope, near    2. Anemia, unspecified type    3. 20 weeks gestation of pregnancy (HCC)         Disposition:  Discharge  2/14/2025 11:15 am    Follow-up:  Marnie Mccurdy MD  [address] [place] [id number] [phone]    Schedule an appointment as soon as possible for a visit            Medications Prescribed:  Discharge Medication List as of 2/14/2025 11:25 AM              Supplementary Documentation:

## 2025-02-14 NOTE — ED INITIAL ASSESSMENT (HPI)
Pt reports that she felt like she was going to pass out this morning at work, Pt states she felt weal all of a sudden. Pt reports no fever/n/v.         Spoke to Azalea jackson RN in FB, pt to bee seen here first and call when done.

## 2025-02-15 NOTE — TELEPHONE ENCOUNTER
20w3d. Uma reaching out after ER visit yesterday d/t near syncope concern. She was seen, given IVF and discharged home. She is calling today indicating symptoms have resolved but does have increase fatigue. She is asking if she needs be seen before her 2/21 [facility] appointment. We did review available appointment options, she is unable to accept them d/t work. She would like to keep current scheduled appointment.     We did go over ER's recommendations for compression stocking, to make sure she is hitting hydration goals of 64 ozs of water daily. Make sure she is eating frequent small meals throughout the day. Change positions slowly. We also reviewed her Hgb of 9.9. She is taking a prenatal vitamins with Iron in it daily. Gave recommendations to start OTC Slow FE at this time. We addressed taking it a different time then her prenatal vitamins and reviewed constipation precautions. She will monitor for any return of s/s and reach to office sooner then appointment if they return. She states understanding of all education above.     To Dr. Diaz on-call for review and sign-off. Thank you.

## 2025-02-15 NOTE — TELEPHONE ENCOUNTER
Patient called. Would like to know if she should come in earlier than scheduled Prenatal appointment. Please call.

## 2025-02-20 NOTE — PROGRESS NOTES
Outpatient Maternal-Fetal Medicine Consultation    Dear Dr. Diaz    Thank you for requesting ultrasound evaluation and maternal fetal medicine consultation on your patient Uma Umaña.  As you are aware she is a 39 year old female  with a singletonpregnancy and an Estimated Date of Delivery: 25.  A maternal-fetal medicine consultation was requested secondary to advanced maternal age.  Her prenatal records and labs were reviewed.    HISTORY  # 1 - Date: 09/15/21, Sex: Female, Weight: 7 lb 6 oz (3.345 kg), GA: 38w1d, Type: Normal spontaneous vaginal delivery, Apgar1: 8, Apgar5: 9, Living: Living, Birth Comments: None    # 2 - Date: None, Sex: None, Weight: None, GA: None, Type: None, Apgar1: None, Apgar5: None, Living: None, Birth Comments: None      Past Medical History  The patient  has a past medical history of Asthma () and Human papilloma virus infection ().    Past Surgical History  The patient  has a past surgical history that includes colposcopy, cervix w/upper adjacent vagina; w/biopsy(s), cervix ().    Family History  The patient She indicated that her mother is alive. She indicated that her father is alive. She indicated that her maternal grandmother is . She indicated that her maternal grandfather is . She indicated that her paternal grandmother is . She indicated that her paternal grandfather is .      Medications:   Current Outpatient Medications:     Ferrous Sulfate ER (SLOW FE) 45 MG Oral Tab CR, , Disp: , Rfl:     loratadine 10 MG Oral Tab, Take 1 tablet (10 mg total) by mouth daily., Disp: , Rfl:     prenatal multivitamin plus DHA 27-0.8-228 MG Oral Cap, Take 1 capsule by mouth daily., Disp: , Rfl:   Allergies: Allergies[1]    PHYSICAL EXAMINATION:  /75   Pulse 97   Wt 152 lb (68.9 kg)   LMP 2024 (Exact Date)   BMI 26.93 kg/m²   General: alert and oriented,no acute distress  Abdomen: gravid, soft, non-tender  Extremities:  non-tender, no edema    OBSTETRIC ULTRASOUND  The patient had a level II ultrasound today which revealed size consistent with dates and a normal detailed anatomic survey.      Ultrasound Findings:  Single IUP in breech presentation.    Placenta is anterior, high.   A 3 vessel cord is noted.  Cardiac activity is present at 156 bpm   g ( 1 lb 1 oz);   MVP is 5.2 cm .     The fetal measurements are consistent with the established EDC. No ultrasound evidence of structural abnormalities are seen today. The nasal bone is present. No ultrasound evidence of markers for aneuploidy are seen. She understands that ultrasound exam cannot exclude genetic abnormalities and that genetic testing is recommended. The limitations of ultrasound were discussed.     Uterus and adnexa appeared normal  today on US    See PACS/Imaging Tab For Complete Ultrasound Report  I interpreted the results and reviewed them with the patient.    DISCUSSION  During her visit we discussed and reviewed the following issues:  ADVANCED MATERNAL AGE    Background  I reviewed with the patient that pregnancies in women of advanced maternal age (35 or older at delivery) are associated with elevated risks.  Specifically, there is a higher rate of:    Fetal malformations  Preeclampsia  Gestational diabetes  Intrauterine fetal death    As a result, enhanced pregnancy surveillance is advised for these patients including a comprehensive ultrasound to assess for fetal malformations  (at 20 weeks) and a third trimester ultrasound assessment for fetal growth (at 32 weeks).  In addition, weekly NST's (initiating at 36 weeks gestation for women 35-39 years and at 32 weeks gestation for women 40 years and older) are also advised.  Routine obstetric care is more than adequate to assess for gestational diabetes and preeclampsia; hence, no further significant alterations in obstetric care are advised.    Fetal Aneuploidy    We also discussed the increased risk of  chromosomal abnormalities associated with advanced maternal age.  I reviewed that an ultrasound examination cannot reliably exclude potential genetic abnormalities. Given that the patient will be 39 years old at the time of delivery I reviewed that her risk (at delivery) of having a  with any chromosome abnormality is 1:83 and with trisomy 21 is 1: 136.    Invasive Testing    I offered invasive genetic testing (amniocentesis, chorionic villus sampling) after reviewing the diagnostic accuracy of these tests as well as the procedure associated loss rate (1:500 for genetic amniocentesis).    She ultimately does not desire invasive genetic testing.     Non-invasive Pregnancy Testing (NIPT)    I reviewed current non-invasive screening options.  Currently non-invasive pregnancy testing (NIPT) offers the highest detection rate (with the lowest false positive rate) for the detection of fetal aneuploidy amongst high-risk patients.  The limitations of detailed mid-trimester sonography was reviewed with the patient.  First trimester screening and second trimester multiple-marker serum serum screening as alternative aneuploidy screening options were also reviewed.  However, both of these tests are associated with lower detection and higher false positive rates.    The patient has declined screening and diagnostic testing for fetal aneuploidy.  She feels that she will not alter the management of her pregnancy even in the presence of a known  fetal aneuploidy.    IMPRESSION:  IUP at 21w2d  AMA: declined screening and invasive testing for fetal aneuploidy    RECOMMENDATIONS:  Continue care with Dr. Diaz  Follow-up growth & BPP ultrasound at 32 weeks.  Weekly NST's at 36 weeks.    Thank you for allowing me to participate in the care of your patient.  Please do not hesitate to contact me if additional questions or concerns arise.      Jean-Paul Page M.D.    40 minutes spent in review of records, patient consultation,  documentation and coordination of care.  The relevant clinical matter(s) are summarized above.     Note to patient and family  The 21st Century Cures Act makes medical notes available to patients in the interest of transparency.  However, please be advised that this is a medical document.  It is intended as gzid-hi-zajt communication.  It is written and medical language may contain abbreviations or verbiage that are technical and unfamiliar.  It may appear blunt or direct.  Medical documents are intended to carry relevant information, facts as evident, and the clinical opinion of the practitioner.         [1]   Allergies  Allergen Reactions    Meperidine HIVES     Other reaction(s): Hives/Urticaria      Sulfa Antibiotics DIZZINESS     Other reaction(s): Other (See Comments)      Sulfamethoxazole W/Trimethoprim DIZZINESS     Other reaction(s): Dizziness

## 2025-02-21 ENCOUNTER — HOSPITAL ENCOUNTER (OUTPATIENT)
Dept: PERINATAL CARE | Facility: HOSPITAL | Age: 40
Discharge: HOME | End: 2025-02-21
Attending: OBSTETRICS & GYNECOLOGY
Payer: COMMERCIAL

## 2025-02-21 ENCOUNTER — ROUTINE PRENATAL (OUTPATIENT)
Dept: OBGYN CLINIC | Facility: CLINIC | Age: 40
End: 2025-02-21
Payer: COMMERCIAL

## 2025-02-21 VITALS
WEIGHT: 151.19 LBS | DIASTOLIC BLOOD PRESSURE: 71 MMHG | BODY MASS INDEX: 27 KG/M2 | SYSTOLIC BLOOD PRESSURE: 111 MMHG | HEART RATE: 91 BPM

## 2025-02-21 VITALS
HEART RATE: 97 BPM | WEIGHT: 152 LBS | SYSTOLIC BLOOD PRESSURE: 122 MMHG | BODY MASS INDEX: 27 KG/M2 | DIASTOLIC BLOOD PRESSURE: 75 MMHG

## 2025-02-21 DIAGNOSIS — Z34.92 ENCOUNTER FOR SUPERVISION OF NORMAL PREGNANCY IN SECOND TRIMESTER, UNSPECIFIED GRAVIDITY (HCC): Primary | ICD-10-CM

## 2025-02-21 DIAGNOSIS — Z36.89 ENCOUNTER FOR FETAL ANATOMIC SURVEY (HCC): ICD-10-CM

## 2025-02-21 DIAGNOSIS — O09.522 MULTIGRAVIDA OF ADVANCED MATERNAL AGE IN SECOND TRIMESTER (HCC): Primary | ICD-10-CM

## 2025-02-21 DIAGNOSIS — O09.522 MULTIGRAVIDA OF ADVANCED MATERNAL AGE IN SECOND TRIMESTER (HCC): ICD-10-CM

## 2025-02-21 PROBLEM — O99.012 ANEMIA DURING PREGNANCY IN SECOND TRIMESTER (HCC): Status: ACTIVE | Noted: 2025-02-21

## 2025-02-21 LAB
APPEARANCE: CLEAR
BILIRUBIN: NEGATIVE
GLUCOSE (URINE DIPSTICK): NEGATIVE MG/DL
KETONES (URINE DIPSTICK): NEGATIVE MG/DL
MULTISTIX LOT#: ABNORMAL NUMERIC
NITRITE, URINE: NEGATIVE
OCCULT BLOOD: NEGATIVE
PH, URINE: 6 (ref 4.5–8)
PROTEIN (URINE DIPSTICK): NEGATIVE MG/DL
SPECIFIC GRAVITY: 1.01 (ref 1–1.03)
URINE-COLOR: YELLOW
UROBILINOGEN,SEMI-QN: 0.2 MG/DL (ref 0–1.9)

## 2025-02-21 PROCEDURE — 81002 URINALYSIS NONAUTO W/O SCOPE: CPT | Performed by: OBSTETRICS & GYNECOLOGY

## 2025-02-21 PROCEDURE — 76811 OB US DETAILED SNGL FETUS: CPT | Performed by: OBSTETRICS & GYNECOLOGY

## 2025-02-21 PROCEDURE — 3078F DIAST BP <80 MM HG: CPT | Performed by: OBSTETRICS & GYNECOLOGY

## 2025-02-21 PROCEDURE — 3074F SYST BP LT 130 MM HG: CPT | Performed by: OBSTETRICS & GYNECOLOGY

## 2025-02-21 RX ORDER — MAGNESIUM OXIDE 400 MG (241.3 MG MAGNESIUM) TABLET
TABLET
COMMUNITY
Start: 2025-02-16

## 2025-02-21 NOTE — PROGRESS NOTES
Feeling well.  Good fetal movement.  MFM level 2 today.  On PO iron after anemia noted from ER visit--syncopal episode.  RTC 4 wks.

## 2025-02-24 ENCOUNTER — PATIENT MESSAGE (OUTPATIENT)
Dept: OBGYN CLINIC | Facility: CLINIC | Age: 40
End: 2025-02-24

## 2025-02-26 ENCOUNTER — TELEPHONE (OUTPATIENT)
Dept: OBGYN CLINIC | Facility: CLINIC | Age: 40
End: 2025-02-26

## 2025-02-26 ENCOUNTER — PATIENT MESSAGE (OUTPATIENT)
Dept: OBGYN CLINIC | Facility: CLINIC | Age: 40
End: 2025-02-26

## 2025-02-26 NOTE — TELEPHONE ENCOUNTER
Patient called for status, located Family Medical Leave Act, logged for processing.  [employer] sent for Release of Information.  Delay letter sent to patient.

## 2025-02-27 NOTE — TELEPHONE ENCOUNTER
To Dr. Zurita, can you please review "[employer]t and advise if any further recommendations. Thank you.

## 2025-02-27 NOTE — TELEPHONE ENCOUNTER
Labored breathing after walking up stairs at this gestational age is common.  CP/shortness of breath at rest is a concern, but with activity, its very common for pregnant patient to experience

## 2025-03-06 NOTE — TELEPHONE ENCOUNTER
Short term disability forms received via "[employer] "[employer] message sent for Release of Information. Logged for processing as 2 of 2.

## 2025-03-06 NOTE — TELEPHONE ENCOUNTER
Dr. Zurita,    **The ACKNOWLEDGE button has been moved to the top right ribbon**    Please sign off on form if you agree to: FMLa and disability for maternity leave  (place your signature on the first page only)  -From your Inbasket, Highlight the patient and click Chart  -Double click the 2/26/25 Forms Completion telephone encounter  -Scroll down to the Media section  -Click the 2 blue Hyperlinks:   Dlsab Dr. Zurita 3/6/25  FMLa Dr. Zurita 3/6/25    -Click and HOLD Acknowledge located in the top right ribbon/menu  -Drag the mouse into the blank space of the document and a + sign will appear. Left click to  electronically sign the document.    Thank you,    Jenny

## 2025-03-07 NOTE — TELEPHONE ENCOUNTER
Forms signed. No release on file. Uploaded forms to [employer]. Called and left voicemail informing patient we were missing release and they're uploaded and she may  a copy at the office.

## 2025-03-18 ENCOUNTER — ROUTINE PRENATAL (OUTPATIENT)
Dept: OBGYN CLINIC | Facility: CLINIC | Age: 40
End: 2025-03-18
Payer: COMMERCIAL

## 2025-03-18 VITALS
BODY MASS INDEX: 27 KG/M2 | DIASTOLIC BLOOD PRESSURE: 68 MMHG | SYSTOLIC BLOOD PRESSURE: 107 MMHG | WEIGHT: 153.81 LBS | HEART RATE: 88 BPM

## 2025-03-18 DIAGNOSIS — Z34.92 ENCOUNTER FOR SUPERVISION OF NORMAL PREGNANCY IN SECOND TRIMESTER, UNSPECIFIED GRAVIDITY (HCC): Primary | ICD-10-CM

## 2025-03-18 LAB
APPEARANCE: CLEAR
BILIRUBIN: NEGATIVE
GLUCOSE (URINE DIPSTICK): NEGATIVE MG/DL
KETONES (URINE DIPSTICK): NEGATIVE MG/DL
MULTISTIX LOT#: ABNORMAL NUMERIC
NITRITE, URINE: NEGATIVE
OCCULT BLOOD: NEGATIVE
PH, URINE: 6.5 (ref 4.5–8)
PROTEIN (URINE DIPSTICK): NEGATIVE MG/DL
SPECIFIC GRAVITY: 1 (ref 1–1.03)
URINE-COLOR: YELLOW
UROBILINOGEN,SEMI-QN: 0.2 MG/DL (ref 0–1.9)

## 2025-03-18 PROCEDURE — 3078F DIAST BP <80 MM HG: CPT | Performed by: OBSTETRICS & GYNECOLOGY

## 2025-03-18 PROCEDURE — 81002 URINALYSIS NONAUTO W/O SCOPE: CPT | Performed by: OBSTETRICS & GYNECOLOGY

## 2025-03-18 PROCEDURE — 3074F SYST BP LT 130 MM HG: CPT | Performed by: OBSTETRICS & GYNECOLOGY

## 2025-03-28 ENCOUNTER — LAB ENCOUNTER (OUTPATIENT)
Dept: LAB | Facility: HOSPITAL | Age: 40
End: 2025-03-28
Attending: OBSTETRICS & GYNECOLOGY
Payer: COMMERCIAL

## 2025-03-28 DIAGNOSIS — Z34.92 ENCOUNTER FOR SUPERVISION OF NORMAL PREGNANCY IN SECOND TRIMESTER, UNSPECIFIED GRAVIDITY (HCC): ICD-10-CM

## 2025-03-28 LAB
BASOPHILS # BLD AUTO: 0.05 X10(3) UL (ref 0–0.2)
BASOPHILS NFR BLD AUTO: 0.4 %
DEPRECATED HBV CORE AB SER IA-ACNC: 4 NG/ML
DEPRECATED RDW RBC AUTO: 45 FL (ref 35.1–46.3)
EOSINOPHIL # BLD AUTO: 0.09 X10(3) UL (ref 0–0.7)
EOSINOPHIL NFR BLD AUTO: 0.7 %
ERYTHROCYTE [DISTWIDTH] IN BLOOD BY AUTOMATED COUNT: 14.5 % (ref 11–15)
GLUCOSE 1H P GLC SERPL-MCNC: 97 MG/DL
HCT VFR BLD AUTO: 30.1 %
HGB BLD-MCNC: 9.8 G/DL
IMM GRANULOCYTES # BLD AUTO: 0.15 X10(3) UL (ref 0–1)
IMM GRANULOCYTES NFR BLD: 1.2 %
LYMPHOCYTES # BLD AUTO: 1.8 X10(3) UL (ref 1–4)
LYMPHOCYTES NFR BLD AUTO: 14.9 %
MCH RBC QN AUTO: 27.9 PG (ref 26–34)
MCHC RBC AUTO-ENTMCNC: 32.6 G/DL (ref 31–37)
MCV RBC AUTO: 85.8 FL
MONOCYTES # BLD AUTO: 0.86 X10(3) UL (ref 0.1–1)
MONOCYTES NFR BLD AUTO: 7.1 %
NEUTROPHILS # BLD AUTO: 9.13 X10 (3) UL (ref 1.5–7.7)
NEUTROPHILS # BLD AUTO: 9.13 X10(3) UL (ref 1.5–7.7)
NEUTROPHILS NFR BLD AUTO: 75.7 %
PLATELET # BLD AUTO: 427 10(3)UL (ref 150–450)
RBC # BLD AUTO: 3.51 X10(6)UL
WBC # BLD AUTO: 12.1 X10(3) UL (ref 4–11)

## 2025-03-28 PROCEDURE — 82728 ASSAY OF FERRITIN: CPT

## 2025-03-28 PROCEDURE — 82950 GLUCOSE TEST: CPT

## 2025-03-28 PROCEDURE — 36415 COLL VENOUS BLD VENIPUNCTURE: CPT

## 2025-03-28 PROCEDURE — 85025 COMPLETE CBC W/AUTO DIFF WBC: CPT

## 2025-03-31 ENCOUNTER — TELEPHONE (OUTPATIENT)
Dept: OBGYN CLINIC | Facility: CLINIC | Age: 40
End: 2025-03-31

## 2025-03-31 ENCOUNTER — TELEPHONE (OUTPATIENT)
Age: 40
End: 2025-03-31

## 2025-03-31 DIAGNOSIS — O99.012 ANEMIA DURING PREGNANCY IN SECOND TRIMESTER (HCC): Primary | ICD-10-CM

## 2025-03-31 NOTE — TELEPHONE ENCOUNTER
Patient informed of results and recommendations.  Referral placed for hematology.  Patient also advised to increase fluids and fiber and take colace if constipation becomes an issue.

## 2025-03-31 NOTE — TELEPHONE ENCOUNTER
Pt is 26w5d and calling for lab results from 3/28. Message to SHEMARK (on call) to please review pts CBC and ferritin. Pt is already taking daily iron.

## 2025-03-31 NOTE — TELEPHONE ENCOUNTER
New consult  Uma Umaña 11/25/1985  Referred by Dr. Jessi Werner [phone]  Referred to Dr. Barahona  Anemia during pregnancy in second trimester (HCC)

## 2025-03-31 NOTE — TELEPHONE ENCOUNTER
No improvement in Hgb 9.8 and low ferritin.  Increase iron to bid  Would also see Dr Barahona hematology

## 2025-04-07 ENCOUNTER — OFFICE VISIT (OUTPATIENT)
Age: 40
End: 2025-04-07
Attending: INTERNAL MEDICINE
Payer: COMMERCIAL

## 2025-04-07 VITALS
HEIGHT: 63 IN | RESPIRATION RATE: 16 BRPM | DIASTOLIC BLOOD PRESSURE: 79 MMHG | WEIGHT: 159 LBS | OXYGEN SATURATION: 100 % | HEART RATE: 99 BPM | BODY MASS INDEX: 28.17 KG/M2 | TEMPERATURE: 98 F | SYSTOLIC BLOOD PRESSURE: 116 MMHG

## 2025-04-07 DIAGNOSIS — K90.9 MALABSORPTION OF IRON (HCC): ICD-10-CM

## 2025-04-07 DIAGNOSIS — O99.019 IRON DEFICIENCY ANEMIA DURING PREGNANCY (HCC): Primary | ICD-10-CM

## 2025-04-07 DIAGNOSIS — D50.9 IRON DEFICIENCY ANEMIA DURING PREGNANCY (HCC): Primary | ICD-10-CM

## 2025-04-07 NOTE — CONSULTS
[facility] Hematology Oncology  Consultation Note    Patient Name: Uma Umaña   YOB: 1985   Medical Record Number: [id number]   CSN: [id number]   Consulting Physician: Eleni Padilla MD  Referring Physician(s): Jessi Werner  Date of Consultation: 2025     Reason for Consultation:    Encounter Diagnoses   Name Primary?    Iron deficiency anemia during pregnancy (HCC) Yes    Malabsorption of iron (HCC)        History of Present Illness:   Uma Umañais a 39 year old White female , currently 27 weeks pregnant, referred for evaluation of iron deficiency anemia.   Patient presented to ER on 25 for near syncope found to be hypotensive and anemic. Hgb 9.9. her baseline Hgb ~12.  She was started on slow Fe supplements daily with no improvement. Repeat labs on 3/28/25 showed Hgb 9.8, and ferritin 4.  She reports progressive fatigue, exertional dyspnea and lightheadedness, worsening over the past few weeks. Also noticed ice craving. She tolerated oral iron well with no side effects. No dyspnea at rest, chest pain, cough, or fever. Otherwise her pregnancy is going well. No prior history of iron deficiency or bleeding issues. Her periods were regular and normal. No history of GI problems.       Past Medical History:  Past Medical History:    Asthma (HCC)    In her teens-exercise induced    Human papilloma virus infection     Past Surgical History:  Past Surgical History:   Procedure Laterality Date    Colposcopy, cervix w/upper adjacent vagina; w/biopsy(s), cervix       Family Medical History:  Family History   Problem Relation Age of Onset    Heart Attack Father     Prostate Cancer Father     Hypertension Mother     Lipids Mother     Other (Non-hodgkins lymphoma) Mother     Other (Parkinsons) Maternal Grandmother     Heart Disease Paternal Grandmother     Heart Disease Paternal Grandfather      Gyne History:  OB History    Para Term  AB Living   2 1 1 0 0 1   SAB IAB  Ectopic Multiple Live Births   0 0 0 0 1       Psychosocial History:  Social History     Socioeconomic History    Marital status:      Spouse name: Not on file    Number of children: Not on file    Years of education: Not on file    Highest education level: Not on file   Occupational History    Not on file   Tobacco Use    Smoking status: Former     Current packs/day: 0.00     Types: Cigarettes     Quit date: 2009     Years since quittin.1     Passive exposure: Never    Smokeless tobacco: Never    Tobacco comments:     Smoked socially.     Substance and Sexual Activity    Alcohol use: Not Currently     Comment: Socially. Stopped when found out was pregnant     Drug use: Never    Sexual activity: Not on file   Other Topics Concern    Not on file   Social History Narrative    Not on file     Social Drivers of Health     Food Insecurity: No Food Insecurity (2024)    Food Insecurity     Food Insecurity: Never true   Transportation Needs: No Transportation Needs (2024)    Transportation Needs     Lack of Transportation: No     Car Seat: Not on file   Stress: No Stress Concern Present (2024)    Stress     Feeling of Stress : No   Housing Stability: Low Risk  (2024)    Housing Stability     Housing Instability: No     Housing Instability Emergency: Not on file     Crib or Bassinette: Not on file       Allergies:   Allergies[1]    Current Medications:    Current Outpatient Medications:     Ferrous Sulfate ER (SLOW FE) 45 MG Oral Tab CR, , Disp: , Rfl:     prenatal multivitamin plus DHA 27-0.8-228 MG Oral Cap, Take 1 capsule by mouth daily., Disp: , Rfl:     loratadine 10 MG Oral Tab, Take 1 tablet (10 mg total) by mouth daily. (Patient not taking: Reported on 2025), Disp: , Rfl:     Review of Systems:  A comprehensive 10-point ROS completed all negative unless otherwise documented in HPI.     Vital Signs:  Ht 1.6 m (5' 3\")   Wt 72.1 kg (159 lb)   LMP 2024 (Exact Date)    BMI 28.17 kg/m²     Physical Examination:  General: Pleasant, not in acute distress.  HEENT: Non-icteric sclera. Oropharynx is clear.   Neck: No palpable lymphadenopathy.   Chest: Unlabored breathing.   Heart: Regular rate and rhythm.   Abdomen: Gravid uterus. No tenderness.  Extremities: No edema.  Neurological: Grossly intact.     Laboratory:    Lab Results   Component Value Date    WBC 12.1 (H) 03/28/2025    RBC 3.51 (L) 03/28/2025    HGB 9.8 (L) 03/28/2025    HCT 30.1 (L) 03/28/2025    MCV 85.8 03/28/2025    MCH 27.9 03/28/2025    MCHC 32.6 03/28/2025    RDW 14.5 03/28/2025    .0 03/28/2025     Lab Results   Component Value Date/Time    HGB 9.8 (L) 03/28/2025 03:11 PM    HGB 9.9 (L) 02/14/2025 09:27 AM    HGB 12.5 11/13/2024 03:28 PM    HGB 11.8 (L) 09/16/2021 07:22 AM    HGB 12.6 09/15/2021 06:57 PM    HGB 11.8 (L) 08/27/2021 04:03 PM    HGB 11.7 (L) 06/17/2021 08:47 AM    HGB 13.5 03/06/2021 09:49 AM     Lab Results   Component Value Date     02/14/2025    K 3.7 02/14/2025     02/14/2025    CO2 23.0 02/14/2025    BUN 12 02/14/2025    CREATSERUM 0.51 (L) 02/14/2025    GLU 76 02/14/2025    CA 8.4 (L) 02/14/2025     Lab Results   Component Value Date/Time    JUAN C 4 (L) 03/28/2025 03:11 PM     No results found for: \"SAT\"      Impression & Plan     39 year old female 27 wks pregnant presenting with symptomatic iron deficiency anemia and no response to oral supplements x6 weeks.     I recommend IV iron therapy for rapid correction of anemia and repletion of iron stores which may become more important as the pregnancy progresses, and to ensure iron sufficiency in the developing fetus.     Intravenous iron is safe and effective during the second and third trimesters of pregnancy, with a much lower frequency of adverse effects than oral iron and a negligibly low frequency of serious adverse effects.      We will arrange for injectafer 750 mg weekly x2 doses and repeat her labs in 8-10 weeks to assess  her response. Patient has agreed to the plan.       Return in about 6 months (around 10/7/2025).       Thank you for the consultation request and the opportunity to participate in the care of Uma Umaña. We will continue to follow and provide further recommendations. Please contact me for any questions or concerns.         Eleni Padilla MD   [facility] Hematology Oncology            [1]   Allergies  Allergen Reactions    Meperidine HIVES     Other reaction(s): Hives/Urticaria      Sulfa Antibiotics DIZZINESS     Other reaction(s): Other (See Comments)      Sulfamethoxazole W/Trimethoprim DIZZINESS     Other reaction(s): Dizziness

## 2025-04-14 ENCOUNTER — OFFICE VISIT (OUTPATIENT)
Age: 40
End: 2025-04-14
Attending: INTERNAL MEDICINE
Payer: COMMERCIAL

## 2025-04-14 VITALS
WEIGHT: 157.88 LBS | TEMPERATURE: 98 F | BODY MASS INDEX: 28 KG/M2 | SYSTOLIC BLOOD PRESSURE: 110 MMHG | DIASTOLIC BLOOD PRESSURE: 64 MMHG | OXYGEN SATURATION: 99 % | RESPIRATION RATE: 18 BRPM | HEART RATE: 92 BPM

## 2025-04-14 DIAGNOSIS — O99.019 IRON DEFICIENCY ANEMIA DURING PREGNANCY (HCC): Primary | ICD-10-CM

## 2025-04-14 DIAGNOSIS — D50.9 IRON DEFICIENCY ANEMIA DURING PREGNANCY (HCC): Primary | ICD-10-CM

## 2025-04-14 NOTE — PROGRESS NOTES
Pt here for Injectafer . Pt denies any issues or concerns.      Ordering Provider: Eleni Padilla MD   Order Exp: After one more dose.     Pt tolerated infusion without difficulty or complaint. No s&s of reaction noted, VSS post infusion. PIV removed, gauze and coban applied.   Reviewed next apt date/time: 4/21 at 14:30      Education Record  Learner:  Patient  Disease / Diagnosis: DANI in pregnancy.   Barriers / Limitations:  None  Method:  Reinforcement  General Topics:  Plan of care reviewed  Outcome:  Shows understanding

## 2025-04-15 ENCOUNTER — OFFICE VISIT (OUTPATIENT)
Dept: FAMILY MEDICINE CLINIC | Facility: CLINIC | Age: 40
End: 2025-04-15
Payer: COMMERCIAL

## 2025-04-15 VITALS
HEIGHT: 63 IN | HEART RATE: 94 BPM | RESPIRATION RATE: 18 BRPM | WEIGHT: 157 LBS | SYSTOLIC BLOOD PRESSURE: 114 MMHG | OXYGEN SATURATION: 98 % | BODY MASS INDEX: 27.82 KG/M2 | TEMPERATURE: 97 F | DIASTOLIC BLOOD PRESSURE: 72 MMHG

## 2025-04-15 DIAGNOSIS — J22 LOWER RESPIRATORY INFECTION: Primary | ICD-10-CM

## 2025-04-15 PROCEDURE — 99213 OFFICE O/P EST LOW 20 MIN: CPT | Performed by: PHYSICIAN ASSISTANT

## 2025-04-15 PROCEDURE — 3074F SYST BP LT 130 MM HG: CPT | Performed by: PHYSICIAN ASSISTANT

## 2025-04-15 PROCEDURE — 3078F DIAST BP <80 MM HG: CPT | Performed by: PHYSICIAN ASSISTANT

## 2025-04-15 PROCEDURE — 87798 DETECT AGENT NOS DNA AMP: CPT | Performed by: PHYSICIAN ASSISTANT

## 2025-04-15 PROCEDURE — 3008F BODY MASS INDEX DOCD: CPT | Performed by: PHYSICIAN ASSISTANT

## 2025-04-15 RX ORDER — AZITHROMYCIN 250 MG/1
TABLET, FILM COATED ORAL
Qty: 6 TABLET | Refills: 0 | Status: SHIPPED | OUTPATIENT
Start: 2025-04-15 | End: 2025-04-20

## 2025-04-15 RX ORDER — AZITHROMYCIN 250 MG/1
TABLET, FILM COATED ORAL
Qty: 6 TABLET | Refills: 0 | Status: SHIPPED | OUTPATIENT
Start: 2025-04-15 | End: 2025-04-15 | Stop reason: CLARIF

## 2025-04-15 NOTE — PROGRESS NOTES
CHIEF COMPLAINT:     Chief Complaint   Patient presents with    Cough     2 weeks w/ 28wks pregnant had cough and congestion  OB suggested getting it looked at - Entered by patient       HPI:   Uma Umaña is a 39 year old female who presents with complaints of feeling sick for 2 weeks.  The patient reports nasal congestion, nasal drainage, and cough.  The patient reports her cough is worsening.  The patient denies fever, ear pain, sore throat, CP, SOB, wheezing, abdominal pain, vomiting, diarrhea, and rash.  The patient reports negative Flu, COVID, RSV test when her symptoms first started.  The patient is 28 weeks pregnant. The patient reports two episodes of post tussive vomiting.  The patient is a physical therapist.      Current Medications[1]   Past Medical History[2]   Social History:  Short Social Hx on File[3]     REVIEW OF SYSTEMS:   GENERAL: Denies fever, chills,weight change, decreased appetite  SKIN: Denies rashes, skin wounds or ulcers.  EYES: Denies blurred vision or double vision  HENT: See HPI  CHEST: Denies chest pain, or palpitations  LUNGS: See HPI  GI: Denies abdominal pain, N/V/C/D.   MUSCULOSKELETAL: no arthralgia or swollen joints  LYMPH:  Denies lymphadenopathy  NEURO: Denies headaches or lightheadedness      EXAM:   /72   Pulse 94   Temp 97 °F (36.1 °C)   Resp 18   Ht 5' 3\" (1.6 m)   Wt 157 lb (71.2 kg)   LMP 09/25/2024 (Exact Date)   SpO2 98%   BMI 27.81 kg/m²   GENERAL: well developed, well nourished,in no apparent distress, cooperative   SKIN: no rashes, nosuspicious lesions, no abnormal pigmentation  HEAD: atraumatic, normocephalic  EYES: EOM intact, PERRL.  Conjunctiva normal.  Cornea clear.  Lid margins normal.  No active drainage.  EARS: Right TM normal, no bulging, no retraction, no fluid, bony landmarks normal.  Left TM normal, no bulging, no retraction, no fluid, bony landmarks normal.    NOSE: nostrils patent, no discharge, nasal mucosa pink and not inflamed.  No  sinus tenderness.  THROAT: oral mucosa pink, moist and intact. No visible dental caries. Posterior pharynx without erythema or exudates.  NECK: supple, non-tender.  LUNGS: Minimally coarse breath sounds bilaterally, no wheezes or rhonchi. Breathing is non labored.  +Harsh cough  CARDIO: RRR without murmur  GI: No visible scars, or masses. BS's present x4. No palpable masses or hepatosplenomegaly.  Non tender.  No guarding or rebound tenderness  EXTREMITIES: no cyanosis, clubbing or edema.  Homans NEG.  Dorsalis Pedis 2+.  LYMPH:  No lymphadenopathy.    NEURO: A&Ox3.  CN II-XII intact.  No focal deficits.  Coordination and Gait normal.  Kernig and Brudzinski's are negative.      ASSESSMENT AND PLAN:     ASSESSMENT:  Encounter Diagnosis   Name Primary?    Lower respiratory infection Yes       PLAN:    Patient Instructions   Azithromycin  Pertussis PCR pending  Follow up with PCP   If worsening symptoms to Immediate Care             [1]   Current Outpatient Medications   Medication Sig Dispense Refill    azithromycin (ZITHROMAX Z-NICOLASA) 250 MG Oral Tab Take 2 tablets (500 mg total) by mouth daily for 1 day, THEN 1 tablet (250 mg total) daily for 4 days. 6 tablet 0    Ferrous Sulfate ER (SLOW FE) 45 MG Oral Tab CR       loratadine 10 MG Oral Tab Take 1 tablet (10 mg total) by mouth daily.      prenatal multivitamin plus DHA 27-0.8-228 MG Oral Cap Take 1 capsule by mouth daily.     [2]   Past Medical History:   Asthma (HCC)    In her teens-exercise induced    Human papilloma virus infection   [3]   Social History  Socioeconomic History    Marital status:    Tobacco Use    Smoking status: Former     Current packs/day: 0.00     Types: Cigarettes     Quit date: 2009     Years since quittin.1     Passive exposure: Never    Smokeless tobacco: Never    Tobacco comments:     Smoked socially.     Vaping Use    Vaping status: Never Used   Substance and Sexual Activity    Alcohol use: Not Currently     Comment:  Socially. Stopped when found out was pregnant     Drug use: Never     Social Drivers of Health     Food Insecurity: No Food Insecurity (11/13/2024)    Food Insecurity     Food Insecurity: Never true   Transportation Needs: No Transportation Needs (11/13/2024)    Transportation Needs     Lack of Transportation: No   Stress: No Stress Concern Present (11/13/2024)    Stress     Feeling of Stress : No   Housing Stability: Low Risk  (11/13/2024)    Housing Stability     Housing Instability: No

## 2025-04-15 NOTE — PATIENT INSTRUCTIONS
Azithromycin  Pertussis PCR pending  Follow up with PCP   If worsening symptoms to Immediate Care

## 2025-04-21 ENCOUNTER — ROUTINE PRENATAL (OUTPATIENT)
Dept: OBGYN CLINIC | Facility: CLINIC | Age: 40
End: 2025-04-21

## 2025-04-21 ENCOUNTER — OFFICE VISIT (OUTPATIENT)
Age: 40
End: 2025-04-21
Attending: INTERNAL MEDICINE
Payer: COMMERCIAL

## 2025-04-21 VITALS
DIASTOLIC BLOOD PRESSURE: 65 MMHG | BODY MASS INDEX: 28 KG/M2 | RESPIRATION RATE: 18 BRPM | SYSTOLIC BLOOD PRESSURE: 104 MMHG | WEIGHT: 156.38 LBS | HEART RATE: 90 BPM | OXYGEN SATURATION: 99 % | TEMPERATURE: 98 F

## 2025-04-21 VITALS
HEART RATE: 88 BPM | DIASTOLIC BLOOD PRESSURE: 68 MMHG | WEIGHT: 157 LBS | BODY MASS INDEX: 28 KG/M2 | SYSTOLIC BLOOD PRESSURE: 110 MMHG

## 2025-04-21 DIAGNOSIS — D50.9 IRON DEFICIENCY ANEMIA DURING PREGNANCY (HCC): Primary | ICD-10-CM

## 2025-04-21 DIAGNOSIS — Z34.83 ENCOUNTER FOR SUPERVISION OF OTHER NORMAL PREGNANCY IN THIRD TRIMESTER (HCC): Primary | ICD-10-CM

## 2025-04-21 DIAGNOSIS — O99.019 IRON DEFICIENCY ANEMIA DURING PREGNANCY (HCC): Primary | ICD-10-CM

## 2025-04-21 DIAGNOSIS — O26.813 FATIGUE DURING PREGNANCY IN THIRD TRIMESTER (HCC): ICD-10-CM

## 2025-04-21 PROBLEM — O26.841 SIGNIFICANT DISCREPANCY BETWEEN UTERINE SIZE AND CLINICAL DATES, ANTEPARTUM, FIRST TRIMESTER (HCC): Status: RESOLVED | Noted: 2024-11-25 | Resolved: 2025-04-21

## 2025-04-21 LAB
APPEARANCE: CLEAR
GLUCOSE (URINE DIPSTICK): NEGATIVE MG/DL
KETONES (URINE DIPSTICK): NEGATIVE MG/DL
MULTISTIX LOT#: NORMAL NUMERIC
NITRITE, URINE: NEGATIVE
PROTEIN (URINE DIPSTICK): NEGATIVE MG/DL

## 2025-04-21 NOTE — PROGRESS NOTES
History of Present Illness  Uma Umaña is a 39 year old female who presents for a routine prenatal visit at 29 weeks and 5 days gestation.    She is currently pregnant with her second child and is at 29 weeks and 5 days gestation. A level two ultrasound performed in February was normal. No contractions, leaking of water, or blood. Her current child at home is three and a [age] years old.    She recently saw a hematologist and received an iron infusion earlier today, with a previous infusion last week. She continues to take oral iron supplements. Despite the iron infusions, she feels 'super tired'. She has not had her thyroid checked but is open to having it evaluated. She is trying to avoid a blood transfusion.    She has not received the whooping cough vaccine during this pregnancy but is interested in getting it before 36 weeks.    Physical Exam    MEASUREMENTS: Height- 30.0.  CARDIOVASCULAR: Heart rate 141 bpm with acceleration    Results  RADIOLOGY  Level 2 Ultrasound: Normal (February 2025)    Assessment & Plan  Iron deficiency anemia  Iron deficiency anemia is being managed with iron infusions. She received an infusion today and had her first infusion last week. Persistent fatigue is reported despite treatment. Thyroid dysfunction is considered as a contributing factor to fatigue, with an offer to check thyroid function. Iron infusions are emphasized as a long-term treatment to avoid transfusion.  - Continue scheduled iron infusions  - Order blood tests at the end of June to assess response to iron infusions  - Continue oral iron supplementation as advised by hematology  - Consider ordering thyroid function tests to evaluate for thyroid dysfunction  Wishes for tDAP next visit

## 2025-04-21 NOTE — PROGRESS NOTES
The following individual(s) verbally consented to be recorded using ambient AI listening technology and understand that they can each withdraw their consent to this listening technology at any point by asking the clinician to turn off or pause the recording:    Patient name: Uma Cindy Umaña  Additional names:

## 2025-04-21 NOTE — PROGRESS NOTES
Pt here for injectafer 2 of 2 . Pt denies any issues or concerns.      Ordering Provider: Randy       Pt tolerated infusion without difficulty or complaint. Reviewed next apt date/time: tx completed.  Pt aware of follow up labs and appointment.      Education Record  Learner:  Patient  Disease / Diagnosis: DANI  Barriers / Limitations:  None  Method:  Discussion  General Topics:  Plan of care reviewed  Outcome:  Shows understanding

## 2025-04-30 NOTE — PROGRESS NOTES
Outpatient Maternal-Fetal Medicine Follow-Up    Dear Dr. Bishop    Thank you for requesting an ultrasound and maternal-fetal medicine consultation on your patient Uma Umaña.  As you are aware she is a 39 year old female  with a becker pregnancy and an Estimated Date of Delivery: 25.  She returned to maternal-fetal medicine today for a follow-up visit.  Her history was reviewed from her prior visit and there were no interval changes.    Antepartum Risk Factors  AMA: declined screening and invasive testing for fetal aneuploidy    PHYSICAL EXAMINATION:  /73   Pulse 108   Wt 160 lb (72.6 kg)   LMP 2024 (Exact Date)   BMI 29.26 kg/m²   General: alert and oriented, no acute distress  Abdomen: gravid, soft, non-tender  Extremities: non-tender, no edema    OBSTETRIC ULTRASOUND  The patient had a follow-up growth ultrasound today which revealed normal interval fetal growth, BPP 8/8.    Ultrasound Findings:  Single IUP in cephalic presentation.    Placenta is anterior.   A 3 vessel cord is noted.  Cardiac activity is present at 141 bpm  EFW 2195 g ( 4 lb 13 oz); 64%.    RADHA is  14 cm.  MVP is 4.5 cm  BPP is 8/8.     The fetal measurements are consistent with established EDC. No gross ultrasound evidence of structural abnormalities are seen today. The patient understands that ultrasound cannot rule out all structural and chromosomal abnormalities.     See Imaging Tab For Complete Ultrasound Report  I interpreted the results and reviewed them with the patient.    DISCUSSION  During her visit we discussed and reviewed the following issues:  ADVANCED MATERNAL AGE  See prior MFM notes for a detailed review.  She did not desire invasive genetic testing.   She also declined screening and diagnostic testing for fetal aneuploidy.  She feels that she will not alter the management of her pregnancy even in the presence of a known  fetal aneuploidy.    IMPRESSION:  IUP at 32w2d  AMA: declined screening  and invasive testing for fetal aneuploidy    RECOMMENDATIONS:  Continue care with Dr. Bishop  Weekly NST's at 36 weeks    Thank you for allowing me to participate in the care of your patient.  Please do not hesitate to contact me if additional questions or concerns arise.      Jean-Paul Page M.D.    20 minutes spent in review of records, patient consultation, documentation and coordination of care.  The relevant clinical matter(s) are summarized above.     Note to patient and family  The 21st Century Cures Act makes medical notes available to patients in the interest of transparency.  However, please be advised that this is a medical document.  It is intended as sons-rm-vbfi communication.  It is written and medical language may contain abbreviations or verbiage that are technical and unfamiliar.  It may appear blunt or direct.  Medical documents are intended to carry relevant information, facts as evident, and the clinical opinion of the practitioner.

## 2025-05-02 ENCOUNTER — LAB ENCOUNTER (OUTPATIENT)
Dept: LAB | Facility: HOSPITAL | Age: 40
End: 2025-05-02
Attending: OBSTETRICS & GYNECOLOGY
Payer: COMMERCIAL

## 2025-05-02 DIAGNOSIS — Z34.83 ENCOUNTER FOR SUPERVISION OF OTHER NORMAL PREGNANCY IN THIRD TRIMESTER (HCC): ICD-10-CM

## 2025-05-02 DIAGNOSIS — O99.019 IRON DEFICIENCY ANEMIA DURING PREGNANCY (HCC): ICD-10-CM

## 2025-05-02 DIAGNOSIS — D50.9 IRON DEFICIENCY ANEMIA DURING PREGNANCY (HCC): ICD-10-CM

## 2025-05-02 DIAGNOSIS — O26.813 FATIGUE DURING PREGNANCY IN THIRD TRIMESTER (HCC): ICD-10-CM

## 2025-05-02 LAB
T PALLIDUM AB SER QL IA: NONREACTIVE
T3FREE SERPL-MCNC: 3.13 PG/ML (ref 2.4–4.2)
T4 FREE SERPL-MCNC: 0.9 NG/DL (ref 0.8–1.7)
TSI SER-ACNC: 0.5 UIU/ML (ref 0.55–4.78)

## 2025-05-02 PROCEDURE — 84481 FREE ASSAY (FT-3): CPT

## 2025-05-02 PROCEDURE — 87389 HIV-1 AG W/HIV-1&-2 AB AG IA: CPT

## 2025-05-02 PROCEDURE — 84439 ASSAY OF FREE THYROXINE: CPT

## 2025-05-02 PROCEDURE — 36415 COLL VENOUS BLD VENIPUNCTURE: CPT

## 2025-05-02 PROCEDURE — 86780 TREPONEMA PALLIDUM: CPT

## 2025-05-02 PROCEDURE — 82728 ASSAY OF FERRITIN: CPT

## 2025-05-02 PROCEDURE — 84443 ASSAY THYROID STIM HORMONE: CPT

## 2025-05-02 PROCEDURE — 83540 ASSAY OF IRON: CPT

## 2025-05-02 PROCEDURE — 84466 ASSAY OF TRANSFERRIN: CPT

## 2025-05-05 ENCOUNTER — ROUTINE PRENATAL (OUTPATIENT)
Dept: OBGYN CLINIC | Facility: CLINIC | Age: 40
End: 2025-05-05
Payer: COMMERCIAL

## 2025-05-05 ENCOUNTER — LAB ENCOUNTER (OUTPATIENT)
Dept: LAB | Facility: HOSPITAL | Age: 40
End: 2025-05-05
Attending: INTERNAL MEDICINE
Payer: COMMERCIAL

## 2025-05-05 VITALS
SYSTOLIC BLOOD PRESSURE: 102 MMHG | WEIGHT: 160.63 LBS | DIASTOLIC BLOOD PRESSURE: 66 MMHG | HEART RATE: 88 BPM | BODY MASS INDEX: 28 KG/M2

## 2025-05-05 DIAGNOSIS — O99.019 IRON DEFICIENCY ANEMIA DURING PREGNANCY (HCC): ICD-10-CM

## 2025-05-05 DIAGNOSIS — D50.9 IRON DEFICIENCY ANEMIA DURING PREGNANCY (HCC): Primary | ICD-10-CM

## 2025-05-05 DIAGNOSIS — O99.019 ANTEPARTUM ANEMIA (HCC): ICD-10-CM

## 2025-05-05 DIAGNOSIS — O99.019 IRON DEFICIENCY ANEMIA DURING PREGNANCY (HCC): Primary | ICD-10-CM

## 2025-05-05 DIAGNOSIS — D50.9 IRON DEFICIENCY ANEMIA DURING PREGNANCY (HCC): ICD-10-CM

## 2025-05-05 DIAGNOSIS — Z34.93 ENCOUNTER FOR SUPERVISION OF NORMAL PREGNANCY IN THIRD TRIMESTER, UNSPECIFIED GRAVIDITY (HCC): Primary | ICD-10-CM

## 2025-05-05 LAB
APPEARANCE: CLEAR
BASOPHILS # BLD AUTO: 0.03 X10(3) UL (ref 0–0.2)
BASOPHILS NFR BLD AUTO: 0.3 %
BILIRUBIN: NEGATIVE
DEPRECATED HBV CORE AB SER IA-ACNC: 271 NG/ML (ref 50–306)
DEPRECATED RDW RBC AUTO: 71.7 FL (ref 35.1–46.3)
EOSINOPHIL # BLD AUTO: 0.08 X10(3) UL (ref 0–0.7)
EOSINOPHIL NFR BLD AUTO: 0.9 %
ERYTHROCYTE [DISTWIDTH] IN BLOOD BY AUTOMATED COUNT: 22.7 % (ref 11–15)
GLUCOSE (URINE DIPSTICK): NEGATIVE MG/DL
HCT VFR BLD AUTO: 34.1 % (ref 35–48)
HGB BLD-MCNC: 11 G/DL (ref 12–16)
IMM GRANULOCYTES # BLD AUTO: 0.14 X10(3) UL (ref 0–1)
IMM GRANULOCYTES NFR BLD: 1.5 %
IRON SATN MFR SERPL: 23 % (ref 15–50)
IRON SERPL-MCNC: 88 UG/DL (ref 50–170)
KETONES (URINE DIPSTICK): NEGATIVE MG/DL
LEUKOCYTES: NEGATIVE
LYMPHOCYTES # BLD AUTO: 1.72 X10(3) UL (ref 1–4)
LYMPHOCYTES NFR BLD AUTO: 18.6 %
MCH RBC QN AUTO: 29.1 PG (ref 26–34)
MCHC RBC AUTO-ENTMCNC: 32.3 G/DL (ref 31–37)
MCV RBC AUTO: 90.2 FL (ref 80–100)
MONOCYTES # BLD AUTO: 0.8 X10(3) UL (ref 0.1–1)
MONOCYTES NFR BLD AUTO: 8.7 %
MULTISTIX LOT#: NORMAL NUMERIC
NEUTROPHILS # BLD AUTO: 6.46 X10 (3) UL (ref 1.5–7.7)
NEUTROPHILS # BLD AUTO: 6.46 X10(3) UL (ref 1.5–7.7)
NEUTROPHILS NFR BLD AUTO: 70 %
NITRITE, URINE: NEGATIVE
OCCULT BLOOD: NEGATIVE
PH, URINE: 6.5 (ref 4.5–8)
PLATELET # BLD AUTO: 318 10(3)UL (ref 150–450)
PLATELET MORPHOLOGY: NORMAL
RBC # BLD AUTO: 3.78 X10(6)UL (ref 3.8–5.3)
SPECIFIC GRAVITY: 1.01 (ref 1–1.03)
TOTAL IRON BINDING CAPACITY: 384 UG/DL (ref 250–425)
TRANSFERRIN SERPL-MCNC: 301 MG/DL (ref 250–380)
URINE-COLOR: YELLOW
UROBILINOGEN,SEMI-QN: 0.2 MG/DL (ref 0–1.9)
WBC # BLD AUTO: 9.2 X10(3) UL (ref 4–11)

## 2025-05-05 PROCEDURE — 85025 COMPLETE CBC W/AUTO DIFF WBC: CPT

## 2025-05-05 PROCEDURE — 36415 COLL VENOUS BLD VENIPUNCTURE: CPT

## 2025-05-05 NOTE — PROGRESS NOTES
Tdap today, has had iron infusions x2- will repeat cbc today, has persistent in spite of tx w/ Z-cami- rec zyrtec at bedtime.

## 2025-05-07 ENCOUNTER — TELEPHONE (OUTPATIENT)
Dept: OBGYN CLINIC | Facility: CLINIC | Age: 40
End: 2025-05-07

## 2025-05-07 ENCOUNTER — TELEPHONE (OUTPATIENT)
Dept: ADMINISTRATIVE | Age: 40
End: 2025-05-07

## 2025-05-07 NOTE — TELEPHONE ENCOUNTER
Uma called and notified. She does not have a current PCP. Informed RN will send a list of providers via [employer]. Encouraged to make appointment, if can not be seen with in a week let our office know. She states understanding.

## 2025-05-07 NOTE — TELEPHONE ENCOUNTER
Patient contacts primary care office on the recommendation of OB.  Elevated TSH in pregnancy, may need to be started on medication.  Patient originally scheduled for 05/19.  OB recommended appointment within 1 week.  Patient spoke to this nurse who rescheduled her for tomorrow, location provided.

## 2025-05-07 NOTE — TELEPHONE ENCOUNTER
Left message to call back.----- Message from Marnie Mccurdy sent at 5/4/2025  8:56 AM CDT -----  TSH abnormal -- due to excess fatigue, discuss with PCP to put on synthyroid  ----- Message -----  From: Lab, Background User  Sent: 5/2/2025   4:54 PM CDT  To: Marnie Mccurdy MD

## 2025-05-08 ENCOUNTER — PATIENT MESSAGE (OUTPATIENT)
Dept: INTERNAL MEDICINE CLINIC | Facility: CLINIC | Age: 40
End: 2025-05-08

## 2025-05-08 ENCOUNTER — OFFICE VISIT (OUTPATIENT)
Dept: INTERNAL MEDICINE CLINIC | Facility: CLINIC | Age: 40
End: 2025-05-08

## 2025-05-08 VITALS
HEIGHT: 62 IN | DIASTOLIC BLOOD PRESSURE: 72 MMHG | RESPIRATION RATE: 17 BRPM | HEART RATE: 97 BPM | TEMPERATURE: 98 F | OXYGEN SATURATION: 98 % | SYSTOLIC BLOOD PRESSURE: 98 MMHG | WEIGHT: 161 LBS | BODY MASS INDEX: 29.63 KG/M2

## 2025-05-08 DIAGNOSIS — Z3A.32 32 WEEKS GESTATION OF PREGNANCY (HCC): ICD-10-CM

## 2025-05-08 DIAGNOSIS — Z76.89 ENCOUNTER TO ESTABLISH CARE: Primary | ICD-10-CM

## 2025-05-08 DIAGNOSIS — O99.019 IRON DEFICIENCY ANEMIA DURING PREGNANCY (HCC): ICD-10-CM

## 2025-05-08 DIAGNOSIS — R79.89 ABNORMAL TSH: ICD-10-CM

## 2025-05-08 DIAGNOSIS — D50.9 IRON DEFICIENCY ANEMIA DURING PREGNANCY (HCC): ICD-10-CM

## 2025-05-08 PROCEDURE — 99204 OFFICE O/P NEW MOD 45 MIN: CPT | Performed by: INTERNAL MEDICINE

## 2025-05-08 PROCEDURE — 3008F BODY MASS INDEX DOCD: CPT | Performed by: INTERNAL MEDICINE

## 2025-05-08 PROCEDURE — 3074F SYST BP LT 130 MM HG: CPT | Performed by: INTERNAL MEDICINE

## 2025-05-08 PROCEDURE — 36415 COLL VENOUS BLD VENIPUNCTURE: CPT | Performed by: INTERNAL MEDICINE

## 2025-05-08 PROCEDURE — 3078F DIAST BP <80 MM HG: CPT | Performed by: INTERNAL MEDICINE

## 2025-05-08 NOTE — PROGRESS NOTES
Subjective:     Patient ID: Uma Umaña is a 39 year old female.    HPI  Patient comes in today first time to establish care she is 32 weeks pregnant follows with her OB/GYN He and hematology she has had iron deficient anemia through pregnancy she is needed to iron infusions doing well on that since right now but had some other labs done by OB/GYN He which found TSH to be slightly off on the lower side T3-T4 normal told to follow-up with PCP for possible need for thyroid treatment.   History/Other:   Review of Systems   Constitutional: Negative.    HENT: Negative.     Eyes: Negative.    Respiratory: Negative.     Cardiovascular: Negative.    Gastrointestinal: Negative.    Genitourinary: Negative.    Musculoskeletal: Negative.    Skin: Negative.    Neurological: Negative.    Psychiatric/Behavioral: Negative.       Current Medications[1]  Allergies:Allergies[2]    Past Medical History[3]   Past Surgical History[4]   Family History[5]   Social History: Short Social Hx on File[6]     Objective:   Physical Exam  Vitals and nursing note reviewed.   Constitutional:       Appearance: She is well-developed.   HENT:      Head: Normocephalic and atraumatic.      Right Ear: External ear normal.      Left Ear: External ear normal.      Nose: Nose normal.   Eyes:      Conjunctiva/sclera: Conjunctivae normal.      Pupils: Pupils are equal, round, and reactive to light.   Cardiovascular:      Rate and Rhythm: Normal rate and regular rhythm.      Heart sounds: Normal heart sounds.   Pulmonary:      Effort: Pulmonary effort is normal.      Breath sounds: Normal breath sounds.   Abdominal:      General: Bowel sounds are normal.      Palpations: Abdomen is soft.      Comments: Pregnant abdomen    Genitourinary:     Vagina: Normal.   Musculoskeletal:         General: Normal range of motion.      Cervical back: Normal range of motion and neck supple.   Skin:     General: Skin is warm and dry.   Neurological:      Mental Status: She  is alert and oriented to person, place, and time.      Deep Tendon Reflexes: Reflexes are normal and symmetric.   Psychiatric:         Behavior: Behavior normal.         Thought Content: Thought content normal.         Judgment: Judgment normal.         Assessment & Plan:   1. Encounter to establish care patient will come in for annual physical   2. 32 weeks gestation of pregnancy ([facility]) follows with OB/GYN He   3. Abnormal TSH TSH is slightly off on the lower side T3-T4 within normal will repeat labs in 4 weeks will give referral to endocrine will also order thyroid-stimulating immunoglobulin lab to rule out any autoimmune thyroid disease   4. Iron deficiency anemia during pregnancy ([facility]) stable follows with hematology has been getting iron infusions       Orders Placed This Encounter   Procedures    Thyroid Stimulating Immunoglob [E]    TSH W Reflex To Free T4       Meds This Visit:  Requested Prescriptions      No prescriptions requested or ordered in this encounter       Imaging & Referrals:  ENDOCRINOLOGY - INTERNAL            [1]   Current Outpatient Medications   Medication Sig Dispense Refill    Ferrous Sulfate ER (SLOW FE) 45 MG Oral Tab CR       loratadine 10 MG Oral Tab Take 1 tablet (10 mg total) by mouth daily.      prenatal multivitamin plus DHA 27-0.8-228 MG Oral Cap Take 1 capsule by mouth daily.     [2]   Allergies  Allergen Reactions    Meperidine HIVES     Other reaction(s): Hives/Urticaria      Sulfa Antibiotics DIZZINESS     Other reaction(s): Other (See Comments)      Sulfamethoxazole W/Trimethoprim DIZZINESS     Other reaction(s): Dizziness     [3]   Past Medical History:   Asthma ([facility])    In her teens-exercise induced    Human papilloma virus infection   [4]   Past Surgical History:  Procedure Laterality Date    Colposcopy, cervix w/upper adjacent vagina; w/biopsy(s), cervix  2012   [5]   Family History  Problem Relation Age of Onset    Heart Attack Father     Prostate Cancer Father      Hypertension Mother     Lipids Mother     Other (Non-hodgkins lymphoma) Mother     Other (Parkinsons) Maternal Grandmother     Heart Disease Paternal Grandmother     Heart Disease Paternal Grandfather    [6]   Social History  Socioeconomic History    Marital status:    Tobacco Use    Smoking status: Former     Current packs/day: 0.00     Types: Cigarettes     Quit date: 2009     Years since quittin.1     Passive exposure: Never    Smokeless tobacco: Never    Tobacco comments:     Smoked socially.     Vaping Use    Vaping status: Never Used   Substance and Sexual Activity    Alcohol use: Not Currently     Comment: Socially. Stopped when found out was pregnant     Drug use: Never     Social Drivers of Health     Food Insecurity: No Food Insecurity (2024)    Food Insecurity     Food Insecurity: Never true   Transportation Needs: No Transportation Needs (2024)    Transportation Needs     Lack of Transportation: No   Stress: No Stress Concern Present (2024)    Stress     Feeling of Stress : No   Housing Stability: Low Risk  (2024)    Housing Stability     Housing Instability: No

## 2025-05-09 ENCOUNTER — HOSPITAL ENCOUNTER (OUTPATIENT)
Dept: PERINATAL CARE | Facility: HOSPITAL | Age: 40
Discharge: HOME | End: 2025-05-09
Attending: OBSTETRICS & GYNECOLOGY
Payer: COMMERCIAL

## 2025-05-09 VITALS
WEIGHT: 160 LBS | HEART RATE: 108 BPM | DIASTOLIC BLOOD PRESSURE: 73 MMHG | SYSTOLIC BLOOD PRESSURE: 114 MMHG | BODY MASS INDEX: 29 KG/M2

## 2025-05-09 DIAGNOSIS — O09.523 MULTIGRAVIDA OF ADVANCED MATERNAL AGE IN THIRD TRIMESTER (HCC): ICD-10-CM

## 2025-05-09 DIAGNOSIS — O09.523 MULTIGRAVIDA OF ADVANCED MATERNAL AGE IN THIRD TRIMESTER (HCC): Primary | ICD-10-CM

## 2025-05-09 PROCEDURE — 76819 FETAL BIOPHYS PROFIL W/O NST: CPT

## 2025-05-09 PROCEDURE — 76816 OB US FOLLOW-UP PER FETUS: CPT | Performed by: OBSTETRICS & GYNECOLOGY

## 2025-05-12 LAB — THY STIM IMMUNO: <0.1 IU/L

## 2025-05-15 ENCOUNTER — TELEPHONE (OUTPATIENT)
Dept: OBGYN CLINIC | Facility: CLINIC | Age: 40
End: 2025-05-15

## 2025-05-15 ENCOUNTER — HOSPITAL ENCOUNTER (OUTPATIENT)
Facility: HOSPITAL | Age: 40
Setting detail: OBSERVATION
Discharge: HOME | End: 2025-05-16
Attending: OBSTETRICS & GYNECOLOGY | Admitting: OBSTETRICS & GYNECOLOGY
Payer: COMMERCIAL

## 2025-05-15 PROBLEM — O47.00 THREATENED PRETERM LABOR (HCC): Status: ACTIVE | Noted: 2025-05-15

## 2025-05-15 LAB
BILIRUB UR QL: NEGATIVE
CLARITY UR: CLEAR
COLOR UR: COLORLESS
GLUCOSE UR-MCNC: NORMAL MG/DL
HGB UR QL STRIP.AUTO: NEGATIVE
KETONES UR-MCNC: NEGATIVE MG/DL
LEUKOCYTE ESTERASE UR QL STRIP.AUTO: NEGATIVE
NITRITE UR QL STRIP.AUTO: NEGATIVE
PH UR: 6.5 [PH] (ref 5–8)
PROT UR-MCNC: NEGATIVE MG/DL
SP GR UR STRIP: <1.005 (ref 1–1.03)
UROBILINOGEN UR STRIP-ACNC: NORMAL

## 2025-05-15 RX ORDER — NIFEDIPINE 10 MG/1
10 CAPSULE ORAL EVERY 8 HOURS SCHEDULED
Status: COMPLETED | OUTPATIENT
Start: 2025-05-15 | End: 2025-05-15

## 2025-05-15 RX ORDER — TERBUTALINE SULFATE 1 MG/ML
0.25 INJECTION SUBCUTANEOUS
Status: ACTIVE | OUTPATIENT
Start: 2025-05-15 | End: 2025-05-15

## 2025-05-15 RX ORDER — ZOLPIDEM TARTRATE 5 MG/1
5 TABLET ORAL NIGHTLY PRN
Status: DISCONTINUED | OUTPATIENT
Start: 2025-05-15 | End: 2025-05-16

## 2025-05-15 RX ORDER — NIFEDIPINE 10 MG/1
10 CAPSULE ORAL
Status: DISPENSED | OUTPATIENT
Start: 2025-05-15 | End: 2025-05-15

## 2025-05-15 RX ORDER — TERBUTALINE SULFATE 1 MG/ML
INJECTION SUBCUTANEOUS
Status: COMPLETED
Start: 2025-05-15 | End: 2025-05-15

## 2025-05-15 RX ORDER — BETAMETHASONE SODIUM PHOSPHATE AND BETAMETHASONE ACETATE 3; 3 MG/ML; MG/ML
12 INJECTION, SUSPENSION INTRA-ARTICULAR; INTRALESIONAL; INTRAMUSCULAR; SOFT TISSUE EVERY 24 HOURS
Status: COMPLETED | OUTPATIENT
Start: 2025-05-15 | End: 2025-05-16

## 2025-05-15 RX ORDER — NIFEDIPINE 10 MG/1
CAPSULE ORAL
Status: COMPLETED
Start: 2025-05-15 | End: 2025-05-15

## 2025-05-15 RX ORDER — ACETAMINOPHEN 500 MG
1000 TABLET ORAL EVERY 6 HOURS PRN
Status: DISCONTINUED | OUTPATIENT
Start: 2025-05-15 | End: 2025-05-16

## 2025-05-15 RX ORDER — CHOLECALCIFEROL (VITAMIN D3) 25 MCG
1 TABLET,CHEWABLE ORAL DAILY
Status: DISCONTINUED | OUTPATIENT
Start: 2025-05-15 | End: 2025-05-16

## 2025-05-15 RX ORDER — NIFEDIPINE 10 MG/1
20 CAPSULE ORAL EVERY 6 HOURS
Status: DISCONTINUED | OUTPATIENT
Start: 2025-05-16 | End: 2025-05-16

## 2025-05-15 RX ORDER — SODIUM CHLORIDE, SODIUM LACTATE, POTASSIUM CHLORIDE, CALCIUM CHLORIDE 600; 310; 30; 20 MG/100ML; MG/100ML; MG/100ML; MG/100ML
INJECTION, SOLUTION INTRAVENOUS CONTINUOUS
Status: DISCONTINUED | OUTPATIENT
Start: 2025-05-15 | End: 2025-05-16

## 2025-05-15 RX ORDER — NIFEDIPINE 10 MG/1
20 CAPSULE ORAL EVERY 6 HOURS
Status: DISCONTINUED | OUTPATIENT
Start: 2025-05-16 | End: 2025-05-15

## 2025-05-15 RX ORDER — ACETAMINOPHEN 500 MG
500 TABLET ORAL EVERY 6 HOURS PRN
Status: DISCONTINUED | OUTPATIENT
Start: 2025-05-15 | End: 2025-05-16

## 2025-05-15 RX ORDER — DOCUSATE SODIUM 100 MG/1
100 CAPSULE, LIQUID FILLED ORAL 2 TIMES DAILY
Status: DISCONTINUED | OUTPATIENT
Start: 2025-05-15 | End: 2025-05-16

## 2025-05-15 RX ORDER — CALCIUM CARBONATE 500 MG/1
1000 TABLET, CHEWABLE ORAL
Status: DISCONTINUED | OUTPATIENT
Start: 2025-05-15 | End: 2025-05-16

## 2025-05-15 NOTE — PROGRESS NOTES
Pt is a 39 year old female admitted to TR4/TR4-A.     Chief Complaint   Patient presents with     Assessment     Patient sent up from ED for abdominal pain that patient states started at 11:30 today      Pt is  33w1d intra-uterine pregnancy.  History obtained, consents signed. Oriented to room, staff, and plan of care.

## 2025-05-15 NOTE — TELEPHONE ENCOUNTER
Patient called states she is have extreme pain and cramping on her right side and it's not letting up. She ask if someone would give her a call ASAP

## 2025-05-15 NOTE — H&P
[facility]  part of [facility]    History & Physical    Uma Umaña Patient Status:  Outpatient    1985 MRN [id number]   Location [facility] Attending Sally Bishop MD   Hosp Day # 0 PCP Smith Mccabe MD     Date of Admission:  5/15/2025      HPI:   Uma Umaña is a 39 year old  female, current EGA of 33w1d with an estimated date of delivery of: 2025, by Last Menstrual Period admitted from triage with painful  contractions. She was observed in triage for 4 hours and given IV hydration, subcutaneous terb and betamethasone was started.  She was closed and long per RN exam. When I came to examine her she was found to be 1cm/50/ballotable.  Maternal Fetal Medicine consulted and patient admitted for nifedipine po and observation for threatened  labor.       Review of Systems:   10 point ROS completed and was negative, except for pertinent positive and negatives stated in subjective.    Uma Umaña is being admitted for  labor.    Her current obstetrical history is significant for advanced maternal age.    Patient reports contractions since this am .     Fetal Movement: Yes    History   Obstetric History:   OB History    Para Term  AB Living   2 1 1   1   SAB IAB Ectopic Multiple Live Births      0 1      # Outcome Date GA Lbr Brendan/2nd Weight Sex Type Anes PTL Lv   2 Current            1 Term 09/15/21 38w1d 03:56 / 01:01 7 lb 6 oz (3.345 kg) F NORMAL SPONT EPI N MATA     Past Medical History: Past Medical History[1]  Past Surgerical History: Past Surgical History[2]  Social History:   Social History     Tobacco Use    Smoking status: Former     Current packs/day: 0.00     Types: Cigarettes     Quit date: 2009     Years since quittin.2     Passive exposure: Never    Smokeless tobacco: Never    Tobacco comments:     Smoked socially.     Substance Use Topics    Alcohol use: Not Currently     Comment: Socially.  Stopped when found out was pregnant       Family History:  No pertinent family history  Allergies/Medications:   Allergies:   Allergies[3]  Medications:  Prescriptions Prior to Admission[4]    Review of Systems:   As documented in HPI      Physical Exam:   Temp:  [98 °F (36.7 °C)] 98 °F (36.7 °C)  Pulse:  [98-99] 99  Resp:  [18] 18  BP: (114-118)/(62-80) 114/62    Constitutional: alert and cooperative in mild distress  Psych:  Alert and oriented  Skin:  Warm and hydrated, No rashes  CV:  No edema  Respiratory:  No labored breathing  Abdomen: gravid nontender  : Vaginal exam:  Dilation: 1 cm    Effacement: 50 %    Station: ballotable    Position: Cephalic      Musculoskeletal:  No calf tenderness    FHT assessment:   Baseline: 160 bpm   Variability: moderate   Accels:  Yes   Decels: No   Tocos:  Yes, q 2-5 minutes   Category: 1 tracing    Results:       No results found.         Assessment/Plan:     * No active hospital problems. *       Threatened  Labor    Treatment Plan:  Intervention: hydration, nifedipine protocol, patient received terbutaline x 2 doses..    Sally Bishop MD  5/15/2025  5:58 PM         [1]   Past Medical History:   Asthma (HCC)    In her teens-exercise induced    Human papilloma virus infection   [2]   Past Surgical History:  Procedure Laterality Date    Colposcopy, cervix w/upper adjacent vagina; w/biopsy(s), cervix     [3]   Allergies  Allergen Reactions    Meperidine HIVES     Other reaction(s): Hives/Urticaria      Sulfa Antibiotics DIZZINESS     Other reaction(s): Other (See Comments)      Sulfamethoxazole W/Trimethoprim DIZZINESS     Other reaction(s): Dizziness     [4]   Medications Prior to Admission   Medication Sig Dispense Refill Last Dose/Taking    Ferrous Sulfate ER (SLOW FE) 45 MG Oral Tab CR    2025    loratadine 10 MG Oral Tab Take 1 tablet (10 mg total) by mouth in the morning.   2025    prenatal multivitamin plus DHA 27-0.8-228 MG Oral Cap Take 1  capsule by mouth in the morning.   2025    [] azithromycin (ZITHROMAX Z-NICOLASA) 250 MG Oral Tab Take 2 tablets (500 mg total) by mouth daily for 1 day, THEN 1 tablet (250 mg total) daily for 4 days. 6 tablet 0

## 2025-05-15 NOTE — TELEPHONE ENCOUNTER
33w1d. Uma calling right sided pain. Started 40 mins ago. States it is on the front part of abdomen. Sharp shooting, feels that is she takes a deep breath it cause increase pain. She has tried movement, stretching, lying on the left side and heating pad w/o relief. She denies it is RLQ, states it is more along the right abdomen. She denies ctxs, no leakage of fluid, vaginal bleeding and has +fetal movement. She states pain now is 6/10, but increase to over an 8/10. She was Mycharting earlier with nurses about some discharge with odor. She is having regular bowel movements and urinating w/o issue. She has been hydrating well. She is worried about this pain.     Informed this does not sound OB related. Even if possible vaginal infection would not expect on-set right sided abdominal pain. Recommendations to head to ER to have area evaluated. The will [facility]. Informed they can do cultures there as well for the infection. She states understanding.     To Dr. Bishop on-call, guy. Thank you.

## 2025-05-16 VITALS
SYSTOLIC BLOOD PRESSURE: 93 MMHG | HEART RATE: 98 BPM | WEIGHT: 160 LBS | RESPIRATION RATE: 16 BRPM | TEMPERATURE: 99 F | DIASTOLIC BLOOD PRESSURE: 54 MMHG | HEIGHT: 62 IN | BODY MASS INDEX: 29.44 KG/M2

## 2025-05-16 PROCEDURE — 99238 HOSP IP/OBS DSCHRG MGMT 30/<: CPT | Performed by: OBSTETRICS & GYNECOLOGY

## 2025-05-16 NOTE — DISCHARGE SUMMARY
[facility]  part of [facility]    Discharge Summary    Uma Umaña Patient Status:  Observation    1985 MRN [id number]   Location [facility] Attending Sally Bishop MD   Hosp Day # 0       Admission Date:  5/15/2025    Discharge Date: 2025    EDC: Estimated Date of Delivery: 25    Gestational Age: 33w2d    Antepartum complications:   Patient Active Problem List   Diagnosis    AMA (advanced maternal age) multigravida 35+ (HCC)    Anemia during pregnancy in second trimester (HCC)    Iron deficiency anemia during pregnancy (HCC)    Threatened  labor ([facility])         Hospital Course: Admitted for observation for threatened  labor.  Had painful contractions with cervical change to 1 cm.   No further cervical change.   Received steroids x 2 and nifedipine overnight.      Discharged Condition: stable    Disposition: home    Plan:     Follow-up appointment in 3 days for prenatal appointment on Monday

## 2025-05-16 NOTE — PROGRESS NOTES
Pt discharged home in stable condition with written and verbal  labor precautions and kick counts.

## 2025-05-16 NOTE — TELEPHONE ENCOUNTER
Patient was seen in [facility] and was given steroids and nifedipine.  Patient has appointment on 5/19.

## 2025-05-16 NOTE — PROGRESS NOTES
2025, 1:01 PM    Subjective:    Feeling much better.  Occasionally feels contraction    Objective:  Vitals:    05/15/25 2130 25 0130 25 0600 25 0915   BP: 109/70 95/48 101/62 93/54   BP Location: Right arm Left arm Right arm    Pulse: 103 95 85 98   Resp:    Temp: 98.2 °F (36.8 °C) 98.5 °F (36.9 °C) 98.6 °F (37 °C) 98.8 °F (37.1 °C)   TempSrc: Oral Oral Oral Oral   Weight: 160 lb (72.6 kg)      Height: 5' 2\" (1.575 m)          Intake/Output Summary (Last 24 hours) at 2025 1301  Last data filed at 2025 0700  Gross per 24 hour   Intake 1247.5 ml   Output --   Net 1247.5 ml       Fetal heart tones:  FHT :  130 with moderate variability  Decelerations: No  Contractions: q 10 min    Cervical Exam:  FT/50/-4    Assessment:  Threatened  labor    Plan:  Will discharge home after 2nd dose of steroids.    Modified bedrest.  Keep prenatal appointment on Monday      Jessi Werner MD 2025 1:01 PM

## 2025-05-17 LAB — GROUP B STREP BY PCR FOR PCR OVT: NEGATIVE

## 2025-05-19 ENCOUNTER — ROUTINE PRENATAL (OUTPATIENT)
Dept: OBGYN CLINIC | Facility: CLINIC | Age: 40
End: 2025-05-19

## 2025-05-19 VITALS
BODY MASS INDEX: 29 KG/M2 | HEART RATE: 93 BPM | SYSTOLIC BLOOD PRESSURE: 111 MMHG | WEIGHT: 159 LBS | DIASTOLIC BLOOD PRESSURE: 70 MMHG

## 2025-05-19 DIAGNOSIS — Z34.93 ENCOUNTER FOR SUPERVISION OF NORMAL PREGNANCY IN THIRD TRIMESTER, UNSPECIFIED GRAVIDITY (HCC): Primary | ICD-10-CM

## 2025-05-19 LAB
APPEARANCE: CLEAR
BILIRUBIN: NEGATIVE
GLUCOSE (URINE DIPSTICK): NEGATIVE MG/DL
KETONES (URINE DIPSTICK): NEGATIVE MG/DL
MULTISTIX LOT#: ABNORMAL NUMERIC
NITRITE, URINE: NEGATIVE
OCCULT BLOOD: NEGATIVE
PH, URINE: 6.5 (ref 4.5–8)
PROTEIN (URINE DIPSTICK): NEGATIVE MG/DL
SPECIFIC GRAVITY: 1.01 (ref 1–1.03)
URINE-COLOR: YELLOW
UROBILINOGEN,SEMI-QN: 0.2 MG/DL (ref 0–1.9)

## 2025-05-19 PROCEDURE — 3078F DIAST BP <80 MM HG: CPT | Performed by: OBSTETRICS & GYNECOLOGY

## 2025-05-19 PROCEDURE — 3074F SYST BP LT 130 MM HG: CPT | Performed by: OBSTETRICS & GYNECOLOGY

## 2025-05-19 PROCEDURE — 81002 URINALYSIS NONAUTO W/O SCOPE: CPT | Performed by: OBSTETRICS & GYNECOLOGY

## 2025-05-19 NOTE — PROGRESS NOTES
Follow up for threatened PTL.  S/p BMTz.  No more painful UCs.  Random BH.  No vb or leakage of fluid.  +fetal movement.  On modified rest. Okay tot RTW.  Letter sent . RTC 2 wks.

## 2025-05-20 ENCOUNTER — TELEPHONE (OUTPATIENT)
Dept: OBGYN CLINIC | Facility: CLINIC | Age: 40
End: 2025-05-20

## 2025-05-20 NOTE — TELEPHONE ENCOUNTER
Received a written order request from [employer] for a breast pump. Stamped/dated and faxed back. Fax confirmation received sent to scanning

## 2025-06-04 ENCOUNTER — HOSPITAL ENCOUNTER (OUTPATIENT)
Dept: PERINATAL CARE | Facility: HOSPITAL | Age: 40
Discharge: HOME | End: 2025-06-04
Attending: OBSTETRICS & GYNECOLOGY
Payer: COMMERCIAL

## 2025-06-04 ENCOUNTER — NST DOCUMENTATION (OUTPATIENT)
Dept: OBGYN CLINIC | Facility: CLINIC | Age: 40
End: 2025-06-04

## 2025-06-04 VITALS — DIASTOLIC BLOOD PRESSURE: 56 MMHG | SYSTOLIC BLOOD PRESSURE: 113 MMHG | HEART RATE: 80 BPM

## 2025-06-04 DIAGNOSIS — O09.523 MULTIGRAVIDA OF ADVANCED MATERNAL AGE IN THIRD TRIMESTER (HCC): Primary | ICD-10-CM

## 2025-06-04 PROCEDURE — 59025 FETAL NON-STRESS TEST: CPT

## 2025-06-04 PROCEDURE — 59025 FETAL NON-STRESS TEST: CPT | Performed by: OBSTETRICS & GYNECOLOGY

## 2025-06-05 NOTE — NST
Nonstress Test   Patient: Uma Umaña    Gestation: 36w0d    Diagnosis from order: AMA (advanced maternal age) primigravida 35+, third trimester (HCC)  Inpatient order, no diagnosis associated    Problem List: Problem List[1]    NST:        2025   NST DOCUMENTATION   Variability 6-25 BPM   Accelerations Yes   Decelerations None   Baseline 140 BPM   Uterine Irritability No   Contractions Irregular   Acoustic Stimulator No   Nonstress Test Interpretation Reactive   NST Completed by ARMANDO Zavala   Disposition Home    Testing Plan Weekly NST   Provider Notified MD Ton         I agree with the above evaluation. NST completed.  Lorenza Camilo MD  2025  11:27 PM             [1]   Patient Active Problem List  Diagnosis    AMA (advanced maternal age) multigravida 35+ ([facility])    Anemia during pregnancy in second trimester (HCC)    Iron deficiency anemia during pregnancy (HCC)    Threatened  labor ([facility])

## 2025-06-06 ENCOUNTER — ROUTINE PRENATAL (OUTPATIENT)
Dept: OBGYN CLINIC | Facility: CLINIC | Age: 40
End: 2025-06-06
Payer: COMMERCIAL

## 2025-06-06 VITALS
HEART RATE: 94 BPM | DIASTOLIC BLOOD PRESSURE: 70 MMHG | WEIGHT: 164.63 LBS | BODY MASS INDEX: 30 KG/M2 | SYSTOLIC BLOOD PRESSURE: 108 MMHG

## 2025-06-06 DIAGNOSIS — Z34.93 ENCOUNTER FOR SUPERVISION OF NORMAL PREGNANCY IN THIRD TRIMESTER, UNSPECIFIED GRAVIDITY (HCC): Primary | ICD-10-CM

## 2025-06-06 LAB
APPEARANCE: CLEAR
BILIRUBIN: NEGATIVE
GLUCOSE (URINE DIPSTICK): NEGATIVE MG/DL
KETONES (URINE DIPSTICK): NEGATIVE MG/DL
LEUKOCYTES: NEGATIVE
MULTISTIX LOT#: NORMAL NUMERIC
NITRITE, URINE: NEGATIVE
OCCULT BLOOD: NEGATIVE
PH, URINE: 6.5 (ref 4.5–8)
SPECIFIC GRAVITY: 1.01 (ref 1–1.03)
URINE-COLOR: YELLOW
UROBILINOGEN,SEMI-QN: 0.2 MG/DL (ref 0–1.9)

## 2025-06-06 PROCEDURE — 3074F SYST BP LT 130 MM HG: CPT | Performed by: OBSTETRICS & GYNECOLOGY

## 2025-06-06 PROCEDURE — 3078F DIAST BP <80 MM HG: CPT | Performed by: OBSTETRICS & GYNECOLOGY

## 2025-06-06 PROCEDURE — 81002 URINALYSIS NONAUTO W/O SCOPE: CPT | Performed by: OBSTETRICS & GYNECOLOGY

## 2025-06-08 LAB — GROUP B STREP BY PCR FOR PCR OVT: NEGATIVE

## 2025-06-11 ENCOUNTER — APPOINTMENT (OUTPATIENT)
Dept: OBGYN CLINIC | Facility: HOSPITAL | Age: 40
End: 2025-06-11
Payer: COMMERCIAL

## 2025-06-11 ENCOUNTER — HOSPITAL ENCOUNTER (OUTPATIENT)
Facility: HOSPITAL | Age: 40
Discharge: HOME | End: 2025-06-11
Attending: OBSTETRICS & GYNECOLOGY | Admitting: OBSTETRICS & GYNECOLOGY
Payer: COMMERCIAL

## 2025-06-11 VITALS — SYSTOLIC BLOOD PRESSURE: 110 MMHG | HEART RATE: 95 BPM | DIASTOLIC BLOOD PRESSURE: 69 MMHG

## 2025-06-11 DIAGNOSIS — Z34.90 PREGNANCY (HCC): ICD-10-CM

## 2025-06-11 PROCEDURE — 59025 FETAL NON-STRESS TEST: CPT

## 2025-06-11 PROCEDURE — 59025 FETAL NON-STRESS TEST: CPT | Performed by: OBSTETRICS & GYNECOLOGY

## 2025-06-11 NOTE — PROGRESS NOTES
Pt is a 39 year old female admitted to TR3/TR3-A.     Chief Complaint   Patient presents with    Non-stress Test      Pt is  37w0d intra-uterine pregnancy.  History obtained, consents signed. Oriented to room, staff, and plan of care.

## 2025-06-12 ENCOUNTER — ROUTINE PRENATAL (OUTPATIENT)
Dept: OBGYN CLINIC | Facility: CLINIC | Age: 40
End: 2025-06-12
Payer: COMMERCIAL

## 2025-06-12 VITALS
DIASTOLIC BLOOD PRESSURE: 71 MMHG | HEART RATE: 85 BPM | WEIGHT: 164 LBS | SYSTOLIC BLOOD PRESSURE: 107 MMHG | BODY MASS INDEX: 30 KG/M2

## 2025-06-12 DIAGNOSIS — Z34.83 ENCOUNTER FOR SUPERVISION OF OTHER NORMAL PREGNANCY IN THIRD TRIMESTER (HCC): Primary | ICD-10-CM

## 2025-06-12 PROCEDURE — 3074F SYST BP LT 130 MM HG: CPT | Performed by: OBSTETRICS & GYNECOLOGY

## 2025-06-12 PROCEDURE — 3078F DIAST BP <80 MM HG: CPT | Performed by: OBSTETRICS & GYNECOLOGY

## 2025-06-12 PROCEDURE — 81002 URINALYSIS NONAUTO W/O SCOPE: CPT | Performed by: OBSTETRICS & GYNECOLOGY

## 2025-06-13 ENCOUNTER — TELEPHONE (OUTPATIENT)
Dept: OBGYN CLINIC | Facility: CLINIC | Age: 40
End: 2025-06-13

## 2025-06-13 NOTE — TELEPHONE ENCOUNTER
Patient states she had a cervical check yesterday and had spotting with wiping today. Patient states baby moving well, no contractions.  Patient aware spotting can happen after a cervical check, patient aware to call back if any increase in spotting/bleeding. Patient will call with any contractions.

## 2025-06-13 NOTE — TELEPHONE ENCOUNTER
Patient had a prenatal appointment yesterday. Today there is some spotting present when she wipes after urinating and in her underpants. No changes to fetal movement. Please call.

## 2025-06-14 ENCOUNTER — LAB ENCOUNTER (OUTPATIENT)
Dept: LAB | Facility: HOSPITAL | Age: 40
End: 2025-06-14
Attending: OBSTETRICS & GYNECOLOGY
Payer: COMMERCIAL

## 2025-06-14 DIAGNOSIS — D50.9 IRON DEFICIENCY ANEMIA DURING PREGNANCY (HCC): ICD-10-CM

## 2025-06-14 DIAGNOSIS — O99.019 ANTEPARTUM ANEMIA (HCC): ICD-10-CM

## 2025-06-14 DIAGNOSIS — O99.019 IRON DEFICIENCY ANEMIA DURING PREGNANCY (HCC): ICD-10-CM

## 2025-06-14 LAB
DEPRECATED HBV CORE AB SER IA-ACNC: 67 NG/ML (ref 50–306)
DEPRECATED RDW RBC AUTO: 68.7 FL (ref 35.1–46.3)
ERYTHROCYTE [DISTWIDTH] IN BLOOD BY AUTOMATED COUNT: 20.5 % (ref 11–15)
HCT VFR BLD AUTO: 38.5 % (ref 35–48)
HGB BLD-MCNC: 12.7 G/DL (ref 12–16)
IRON SATN MFR SERPL: 42 % (ref 15–50)
IRON SERPL-MCNC: 160 UG/DL (ref 50–170)
MCH RBC QN AUTO: 30.7 PG (ref 26–34)
MCHC RBC AUTO-ENTMCNC: 33 G/DL (ref 31–37)
MCV RBC AUTO: 93 FL (ref 80–100)
PLATELET # BLD AUTO: 303 10(3)UL (ref 150–450)
RBC # BLD AUTO: 4.14 X10(6)UL (ref 3.8–5.3)
TOTAL IRON BINDING CAPACITY: 383 UG/DL (ref 250–425)
TRANSFERRIN SERPL-MCNC: 302 MG/DL (ref 250–380)
WBC # BLD AUTO: 10.1 X10(3) UL (ref 4–11)

## 2025-06-14 PROCEDURE — 84466 ASSAY OF TRANSFERRIN: CPT

## 2025-06-14 PROCEDURE — 82728 ASSAY OF FERRITIN: CPT

## 2025-06-14 PROCEDURE — 36415 COLL VENOUS BLD VENIPUNCTURE: CPT

## 2025-06-14 PROCEDURE — 83540 ASSAY OF IRON: CPT

## 2025-06-14 PROCEDURE — 85027 COMPLETE CBC AUTOMATED: CPT

## 2025-06-16 ENCOUNTER — ROUTINE PRENATAL (OUTPATIENT)
Dept: OBGYN CLINIC | Facility: CLINIC | Age: 40
End: 2025-06-16

## 2025-06-16 VITALS
WEIGHT: 165 LBS | HEART RATE: 87 BPM | SYSTOLIC BLOOD PRESSURE: 109 MMHG | BODY MASS INDEX: 30 KG/M2 | DIASTOLIC BLOOD PRESSURE: 69 MMHG

## 2025-06-16 DIAGNOSIS — Z34.83 ENCOUNTER FOR SUPERVISION OF OTHER NORMAL PREGNANCY IN THIRD TRIMESTER (HCC): Primary | ICD-10-CM

## 2025-06-16 PROCEDURE — 81002 URINALYSIS NONAUTO W/O SCOPE: CPT | Performed by: OBSTETRICS & GYNECOLOGY

## 2025-06-16 PROCEDURE — 3074F SYST BP LT 130 MM HG: CPT | Performed by: OBSTETRICS & GYNECOLOGY

## 2025-06-16 PROCEDURE — 3078F DIAST BP <80 MM HG: CPT | Performed by: OBSTETRICS & GYNECOLOGY

## 2025-06-18 ENCOUNTER — APPOINTMENT (OUTPATIENT)
Dept: OBGYN CLINIC | Facility: HOSPITAL | Age: 40
End: 2025-06-18
Payer: COMMERCIAL

## 2025-06-18 ENCOUNTER — NST DOCUMENTATION (OUTPATIENT)
Dept: OBGYN CLINIC | Facility: CLINIC | Age: 40
End: 2025-06-18

## 2025-06-18 ENCOUNTER — HOSPITAL ENCOUNTER (OUTPATIENT)
Facility: HOSPITAL | Age: 40
Discharge: HOME | End: 2025-06-18
Attending: OBSTETRICS & GYNECOLOGY | Admitting: OBSTETRICS & GYNECOLOGY
Payer: COMMERCIAL

## 2025-06-18 VITALS — SYSTOLIC BLOOD PRESSURE: 112 MMHG | HEART RATE: 92 BPM | DIASTOLIC BLOOD PRESSURE: 74 MMHG

## 2025-06-18 DIAGNOSIS — O09.529 AMA (ADVANCED MATERNAL AGE) MULTIGRAVIDA 35+ (HCC): ICD-10-CM

## 2025-06-18 PROCEDURE — 59025 FETAL NON-STRESS TEST: CPT

## 2025-06-18 PROCEDURE — 59025 FETAL NON-STRESS TEST: CPT | Performed by: OBSTETRICS & GYNECOLOGY

## 2025-06-19 NOTE — PROGRESS NOTES
Pt is a 39 year old female admitted to TR3/TR3-A.     Chief Complaint   Patient presents with    Non-stress Test     AMA      Pt is  38w0d intra-uterine pregnancy.  History obtained, consents signed. Oriented to room, staff, and plan of care.

## 2025-06-19 NOTE — NST
Nonstress Test   Patient: Uma Umaña    Gestation: 38w0d    Diagnosis from order: AMA    Problem List:   Patient Active Problem List   Diagnosis    AMA (advanced maternal age) multigravida 35+ (HCC)    Anemia during pregnancy in second trimester (HCC)    Iron deficiency anemia during pregnancy (HCC)    Threatened  labor (HCC)       NST:        2025   NST DOCUMENTATION   Variability 6-25 BPM   Accelerations Yes   Decelerations None   Baseline 130 BPM   Uterine Irritability No   Contractions Irregular   Contraction Frequency Occasional, mild contractions, not felt by patient   Acoustic Stimulator No   Nonstress Test Interpretation Reactive   Nonstress Test Second Interpretation Reactive   FHR Category Category I   Comments , 38w, here for NST d/t AMA. Patient reports positive fetal movement. NST reactive.   NST Completed by BRIAN Fraser RN   Disposition Home    Testing Plan Weekly NST   Provider Notified Ton GANT I agree with the above evaluation. NST completed.  Lorenza Camilo MD  2025  10:15 PM

## 2025-06-23 ENCOUNTER — NST DOCUMENTATION (OUTPATIENT)
Dept: OBGYN CLINIC | Facility: CLINIC | Age: 40
End: 2025-06-23

## 2025-06-23 NOTE — NST
Nonstress Test   Patient: Uma Umaña    25    Diagnosis from order: AMA (advanced maternal age) primigravida 35+, third trimester (HCC)  Inpatient order, no diagnosis associated    Problem List:   Patient Active Problem List   Diagnosis    AMA (advanced maternal age) multigravida 35+ ([facility])    Anemia during pregnancy in second trimester ([facility])    Iron deficiency anemia during pregnancy ([facility])    Threatened  labor ([facility])       NST: 140/mod/R      Emanuel Zurita DO  2025  12:33 PM

## 2025-06-24 ENCOUNTER — ROUTINE PRENATAL (OUTPATIENT)
Dept: OBGYN CLINIC | Facility: CLINIC | Age: 40
End: 2025-06-24
Payer: COMMERCIAL

## 2025-06-24 ENCOUNTER — TELEPHONE (OUTPATIENT)
Dept: PEDIATRICS CLINIC | Facility: CLINIC | Age: 40
End: 2025-06-24

## 2025-06-24 ENCOUNTER — PATIENT MESSAGE (OUTPATIENT)
Dept: OBGYN CLINIC | Facility: CLINIC | Age: 40
End: 2025-06-24

## 2025-06-24 VITALS
WEIGHT: 164.19 LBS | BODY MASS INDEX: 30 KG/M2 | SYSTOLIC BLOOD PRESSURE: 105 MMHG | HEART RATE: 79 BPM | DIASTOLIC BLOOD PRESSURE: 67 MMHG

## 2025-06-24 DIAGNOSIS — Z34.93 ENCOUNTER FOR SUPERVISION OF NORMAL PREGNANCY IN THIRD TRIMESTER, UNSPECIFIED GRAVIDITY (HCC): Primary | ICD-10-CM

## 2025-06-24 LAB
APPEARANCE: CLEAR
BILIRUBIN: NEGATIVE
GLUCOSE (URINE DIPSTICK): NEGATIVE MG/DL
KETONES (URINE DIPSTICK): NEGATIVE MG/DL
MULTISTIX LOT#: ABNORMAL NUMERIC
NITRITE, URINE: NEGATIVE
OCCULT BLOOD: NEGATIVE
PH, URINE: 6.5 (ref 4.5–8)
SPECIFIC GRAVITY: 1.01 (ref 1–1.03)
URINE-COLOR: YELLOW
UROBILINOGEN,SEMI-QN: 0.2 MG/DL (ref 0–1.9)

## 2025-06-24 PROCEDURE — 81002 URINALYSIS NONAUTO W/O SCOPE: CPT | Performed by: OBSTETRICS & GYNECOLOGY

## 2025-06-24 PROCEDURE — 3078F DIAST BP <80 MM HG: CPT | Performed by: OBSTETRICS & GYNECOLOGY

## 2025-06-24 PROCEDURE — 3074F SYST BP LT 130 MM HG: CPT | Performed by: OBSTETRICS & GYNECOLOGY

## 2025-06-24 NOTE — PROGRESS NOTES
Doing well. Discussed elective IOL since favorable cervix; [facility] appears to have availability on Friday. She wants to think about it and will call if she wants to move forward. RTC 1 wk

## 2025-06-24 NOTE — TELEPHONE ENCOUNTER
Patient called in regarding the my chart message today 06/24/25.  Patient ask what are the induction times for Friday 6/27/25.  Request a nurse to call to advise

## 2025-06-24 NOTE — TELEPHONE ENCOUNTER
Called Uma and informed her that the only current opening is Tuesday 7/1 at 9 am. She would like to talk to her  and will call back if she wants that time. If she would like to move forward with IOL 7/1 at 9 am, please call [facility] to schedule.

## 2025-06-24 NOTE — TELEPHONE ENCOUNTER
Spoke with GREG and she stated pt can have IOL on 6/27 if that's what she wants. GREG stated that IOL would need to be at 9am or earlier.     Called [facility] and was informed that the only time for 6/27 for IOL is 5am. Pt informed. Pt stated she will discuss either 6/27 at 5am or 7/1 at 9am with her  and call back or send a message with her decision.

## 2025-06-25 ENCOUNTER — HOSPITAL ENCOUNTER (INPATIENT)
Facility: HOSPITAL | Age: 40
LOS: 2 days | Discharge: HOME | End: 2025-06-27
Attending: OBSTETRICS & GYNECOLOGY | Admitting: OBSTETRICS & GYNECOLOGY
Payer: COMMERCIAL

## 2025-06-25 ENCOUNTER — APPOINTMENT (OUTPATIENT)
Dept: OBGYN CLINIC | Facility: HOSPITAL | Age: 40
End: 2025-06-25
Payer: COMMERCIAL

## 2025-06-25 ENCOUNTER — ANESTHESIA (OUTPATIENT)
Dept: OBGYN UNIT | Facility: HOSPITAL | Age: 40
End: 2025-06-25
Payer: COMMERCIAL

## 2025-06-25 ENCOUNTER — TELEPHONE (OUTPATIENT)
Dept: OBGYN CLINIC | Facility: CLINIC | Age: 40
End: 2025-06-25

## 2025-06-25 ENCOUNTER — ANESTHESIA EVENT (OUTPATIENT)
Dept: OBGYN UNIT | Facility: HOSPITAL | Age: 40
End: 2025-06-25
Payer: COMMERCIAL

## 2025-06-25 PROBLEM — Z34.90 PREGNANCY (HCC): Status: ACTIVE | Noted: 2025-06-25

## 2025-06-25 LAB
ANTIBODY SCREEN: NEGATIVE
BASOPHILS # BLD AUTO: 0.04 X10(3) UL (ref 0–0.2)
BASOPHILS NFR BLD AUTO: 0.3 %
DEPRECATED RDW RBC AUTO: 63.3 FL (ref 35.1–46.3)
EOSINOPHIL # BLD AUTO: 0.03 X10(3) UL (ref 0–0.7)
EOSINOPHIL NFR BLD AUTO: 0.2 %
ERYTHROCYTE [DISTWIDTH] IN BLOOD BY AUTOMATED COUNT: 19.9 % (ref 11–15)
HCT VFR BLD AUTO: 38.1 % (ref 35–48)
HGB BLD-MCNC: 13.1 G/DL (ref 12–16)
IMM GRANULOCYTES # BLD AUTO: 0.13 X10(3) UL (ref 0–1)
IMM GRANULOCYTES NFR BLD: 0.9 %
LYMPHOCYTES # BLD AUTO: 2.46 X10(3) UL (ref 1–4)
LYMPHOCYTES NFR BLD AUTO: 16.9 %
MCH RBC QN AUTO: 30.5 PG (ref 26–34)
MCHC RBC AUTO-ENTMCNC: 34.4 G/DL (ref 31–37)
MCV RBC AUTO: 88.8 FL (ref 80–100)
MONOCYTES # BLD AUTO: 0.93 X10(3) UL (ref 0.1–1)
MONOCYTES NFR BLD AUTO: 6.4 %
NEUTROPHILS # BLD AUTO: 10.98 X10 (3) UL (ref 1.5–7.7)
NEUTROPHILS # BLD AUTO: 10.98 X10(3) UL (ref 1.5–7.7)
NEUTROPHILS NFR BLD AUTO: 75.3 %
PLATELET # BLD AUTO: 321 10(3)UL (ref 150–450)
RBC # BLD AUTO: 4.29 X10(6)UL (ref 3.8–5.3)
RH BLOOD TYPE: POSITIVE
T PALLIDUM AB SER QL IA: NONREACTIVE
WBC # BLD AUTO: 14.6 X10(3) UL (ref 4–11)

## 2025-06-25 PROCEDURE — 59400 OBSTETRICAL CARE: CPT | Performed by: OBSTETRICS & GYNECOLOGY

## 2025-06-25 PROCEDURE — 10907ZC DRAINAGE OF AMNIOTIC FLUID, THERAPEUTIC FROM PRODUCTS OF CONCEPTION, VIA NATURAL OR ARTIFICIAL OPENING: ICD-10-PCS | Performed by: OBSTETRICS & GYNECOLOGY

## 2025-06-25 RX ORDER — LIDOCAINE HYDROCHLORIDE 10 MG/ML
INJECTION, SOLUTION INFILTRATION; PERINEURAL
Status: COMPLETED | OUTPATIENT
Start: 2025-06-25 | End: 2025-06-25

## 2025-06-25 RX ORDER — ACETAMINOPHEN 500 MG
1000 TABLET ORAL EVERY 6 HOURS PRN
Status: DISCONTINUED | OUTPATIENT
Start: 2025-06-25 | End: 2025-06-25

## 2025-06-25 RX ORDER — IBUPROFEN 600 MG/1
600 TABLET, FILM COATED ORAL EVERY 6 HOURS
Status: DISCONTINUED | OUTPATIENT
Start: 2025-06-25 | End: 2025-06-27

## 2025-06-25 RX ORDER — LIDOCAINE HYDROCHLORIDE AND EPINEPHRINE 15; 5 MG/ML; UG/ML
INJECTION, SOLUTION EPIDURAL
Status: COMPLETED | OUTPATIENT
Start: 2025-06-25 | End: 2025-06-25

## 2025-06-25 RX ORDER — ACETAMINOPHEN 500 MG
1000 TABLET ORAL EVERY 6 HOURS PRN
Status: DISCONTINUED | OUTPATIENT
Start: 2025-06-25 | End: 2025-06-27

## 2025-06-25 RX ORDER — SODIUM CHLORIDE, SODIUM LACTATE, POTASSIUM CHLORIDE, CALCIUM CHLORIDE 600; 310; 30; 20 MG/100ML; MG/100ML; MG/100ML; MG/100ML
INJECTION, SOLUTION INTRAVENOUS AS NEEDED
Status: DISCONTINUED | OUTPATIENT
Start: 2025-06-25 | End: 2025-06-25 | Stop reason: HOSPADM

## 2025-06-25 RX ORDER — ACETAMINOPHEN 500 MG
500 TABLET ORAL EVERY 6 HOURS PRN
Status: DISCONTINUED | OUTPATIENT
Start: 2025-06-25 | End: 2025-06-27

## 2025-06-25 RX ORDER — BUPIVACAINE HCL/0.9 % NACL/PF 0.25 %
5 PLASTIC BAG, INJECTION (ML) EPIDURAL AS NEEDED
Status: DISCONTINUED | OUTPATIENT
Start: 2025-06-25 | End: 2025-06-26

## 2025-06-25 RX ORDER — AMMONIA 15 % (W/V)
0.3 AMPUL (EA) INHALATION AS NEEDED
Status: DISCONTINUED | OUTPATIENT
Start: 2025-06-25 | End: 2025-06-27

## 2025-06-25 RX ORDER — IBUPROFEN 600 MG/1
600 TABLET, FILM COATED ORAL ONCE AS NEEDED
Status: DISCONTINUED | OUTPATIENT
Start: 2025-06-25 | End: 2025-06-25 | Stop reason: HOSPADM

## 2025-06-25 RX ORDER — ACETAMINOPHEN 500 MG
500 TABLET ORAL EVERY 6 HOURS PRN
Status: DISCONTINUED | OUTPATIENT
Start: 2025-06-25 | End: 2025-06-25

## 2025-06-25 RX ORDER — CITRIC ACID/SODIUM CITRATE 334-500MG
30 SOLUTION, ORAL ORAL AS NEEDED
Status: DISCONTINUED | OUTPATIENT
Start: 2025-06-25 | End: 2025-06-25 | Stop reason: HOSPADM

## 2025-06-25 RX ORDER — BUPIVACAINE HYDROCHLORIDE 2.5 MG/ML
20 INJECTION, SOLUTION EPIDURAL; INFILTRATION; INTRACAUDAL; PERINEURAL ONCE
Status: DISCONTINUED | OUTPATIENT
Start: 2025-06-25 | End: 2025-06-25 | Stop reason: HOSPADM

## 2025-06-25 RX ORDER — DEXTROSE, SODIUM CHLORIDE, SODIUM LACTATE, POTASSIUM CHLORIDE, AND CALCIUM CHLORIDE 5; .6; .31; .03; .02 G/100ML; G/100ML; G/100ML; G/100ML; G/100ML
INJECTION, SOLUTION INTRAVENOUS CONTINUOUS
Status: DISCONTINUED | OUTPATIENT
Start: 2025-06-25 | End: 2025-06-25 | Stop reason: HOSPADM

## 2025-06-25 RX ORDER — ONDANSETRON 2 MG/ML
4 INJECTION INTRAMUSCULAR; INTRAVENOUS EVERY 6 HOURS PRN
Status: DISCONTINUED | OUTPATIENT
Start: 2025-06-25 | End: 2025-06-25 | Stop reason: HOSPADM

## 2025-06-25 RX ORDER — TRANEXAMIC ACID 10 MG/ML
1000 INJECTION, SOLUTION INTRAVENOUS ONCE AS NEEDED
Status: ACTIVE | OUTPATIENT
Start: 2025-06-25 | End: 2025-06-25

## 2025-06-25 RX ORDER — TERBUTALINE SULFATE 1 MG/ML
0.25 INJECTION SUBCUTANEOUS AS NEEDED
Status: DISCONTINUED | OUTPATIENT
Start: 2025-06-25 | End: 2025-06-25 | Stop reason: HOSPADM

## 2025-06-25 RX ORDER — LIDOCAINE HYDROCHLORIDE 10 MG/ML
INJECTION, SOLUTION EPIDURAL; INFILTRATION; INTRACAUDAL; PERINEURAL
Status: DISPENSED
Start: 2025-06-25 | End: 2025-06-26

## 2025-06-25 RX ORDER — DOCUSATE SODIUM 100 MG/1
100 CAPSULE, LIQUID FILLED ORAL
Status: DISCONTINUED | OUTPATIENT
Start: 2025-06-25 | End: 2025-06-27

## 2025-06-25 RX ORDER — BUPIVACAINE HYDROCHLORIDE 2.5 MG/ML
INJECTION, SOLUTION EPIDURAL; INFILTRATION; INTRACAUDAL; PERINEURAL
Status: COMPLETED | OUTPATIENT
Start: 2025-06-25 | End: 2025-06-25

## 2025-06-25 RX ORDER — NALBUPHINE HYDROCHLORIDE 10 MG/ML
2.5 INJECTION INTRAMUSCULAR; INTRAVENOUS; SUBCUTANEOUS
Status: DISCONTINUED | OUTPATIENT
Start: 2025-06-25 | End: 2025-06-26

## 2025-06-25 RX ORDER — LIDOCAINE HYDROCHLORIDE 10 MG/ML
30 INJECTION, SOLUTION EPIDURAL; INFILTRATION; INTRACAUDAL; PERINEURAL ONCE
Status: DISCONTINUED | OUTPATIENT
Start: 2025-06-25 | End: 2025-06-25 | Stop reason: HOSPADM

## 2025-06-25 RX ORDER — SIMETHICONE 80 MG
80 TABLET,CHEWABLE ORAL 3 TIMES DAILY PRN
Status: DISCONTINUED | OUTPATIENT
Start: 2025-06-25 | End: 2025-06-27

## 2025-06-25 RX ADMIN — LIDOCAINE HYDROCHLORIDE 5 ML: 10 INJECTION, SOLUTION INFILTRATION; PERINEURAL at 20:35:00

## 2025-06-25 RX ADMIN — BUPIVACAINE HYDROCHLORIDE 5 ML: 2.5 INJECTION, SOLUTION EPIDURAL; INFILTRATION; INTRACAUDAL; PERINEURAL at 20:35:00

## 2025-06-25 RX ADMIN — LIDOCAINE HYDROCHLORIDE AND EPINEPHRINE 5 ML: 15; 5 INJECTION, SOLUTION EPIDURAL at 20:35:00

## 2025-06-25 NOTE — TELEPHONE ENCOUNTER
Patient would like to proceed with scheduling her induction for Friday morning if that time is still available. Please call.

## 2025-06-25 NOTE — TELEPHONE ENCOUNTER
Called [facility] and was informed that there are no IOL times available for Friday 6/27 through Monday 6/30. Parul from [facility] stated that tomorrow 6/26 or 7/1 was available.     Spoke with BRUNILDA (on calL) who stated that pt can be scheduled for IOL tomorrow AM (6/26) but needs to be informed that there is a chance she could get bumped due to how busy [facility] is. Pt informed and verbalized understanding. Spoke with Parul and pt is scheduled for IOL tomorrow 6/26 at 9am. Pt aware to report to FB at that time. Message to SIMBA who will be on call tomorrow as FYI.

## 2025-06-26 LAB
BASOPHILS # BLD AUTO: 0.04 X10(3) UL (ref 0–0.2)
BASOPHILS NFR BLD AUTO: 0.3 %
DEPRECATED RDW RBC AUTO: 67.3 FL (ref 35.1–46.3)
EOSINOPHIL # BLD AUTO: 0.08 X10(3) UL (ref 0–0.7)
EOSINOPHIL NFR BLD AUTO: 0.5 %
ERYTHROCYTE [DISTWIDTH] IN BLOOD BY AUTOMATED COUNT: 19.9 % (ref 11–15)
HCT VFR BLD AUTO: 36.7 % (ref 35–48)
HGB BLD-MCNC: 12.2 G/DL (ref 12–16)
IMM GRANULOCYTES # BLD AUTO: 0.15 X10(3) UL (ref 0–1)
IMM GRANULOCYTES NFR BLD: 1 %
LYMPHOCYTES # BLD AUTO: 1.89 X10(3) UL (ref 1–4)
LYMPHOCYTES NFR BLD AUTO: 12.3 %
MCH RBC QN AUTO: 31 PG (ref 26–34)
MCHC RBC AUTO-ENTMCNC: 33.2 G/DL (ref 31–37)
MCV RBC AUTO: 93.4 FL (ref 80–100)
MONOCYTES # BLD AUTO: 1.29 X10(3) UL (ref 0.1–1)
MONOCYTES NFR BLD AUTO: 8.4 %
NEUTROPHILS # BLD AUTO: 11.89 X10 (3) UL (ref 1.5–7.7)
NEUTROPHILS # BLD AUTO: 11.89 X10(3) UL (ref 1.5–7.7)
NEUTROPHILS NFR BLD AUTO: 77.5 %
PLATELET # BLD AUTO: 309 10(3)UL (ref 150–450)
PLATELET MORPHOLOGY: NORMAL
RBC # BLD AUTO: 3.93 X10(6)UL (ref 3.8–5.3)
WBC # BLD AUTO: 15.3 X10(3) UL (ref 4–11)

## 2025-06-26 RX ORDER — CHOLECALCIFEROL (VITAMIN D3) 25 MCG
1 TABLET,CHEWABLE ORAL DAILY
Status: DISCONTINUED | OUTPATIENT
Start: 2025-06-26 | End: 2025-06-27

## 2025-06-26 NOTE — PROGRESS NOTES
[facility]  part of [facility]    Vaginal Delivery Note    Uma Umaña Patient Status:  Inpatient    1985 MRN [id number]   Location [facility] Attending Keo Diaz DO   Hosp Day # 0 PCP Smith Mccabe MD     Delivery     Infant Info:  Date of Delivery: 2025   Time of Delivery: 9:41 PM  Delivery Type: Normal spontaneous vaginal delivery    Live   Information for the patient's :  Casey Umaña [[id number]]   male infant   Information for the patient's :  Casey Umaña [[id number]]   7 lb 11.1 oz (3.49 kg)   Apgars:  1 minute: 9  Romeo Hoyt               5 minutes: 9                        10 minutes:      Presentation Vertex [1]    Position Right [3] Occiput [1] Anterior [1]    Delivery Narrative: Patient pushed for 6 minutes prior to delivering a live male infant over intact perineum in MATILDA position. Loose nuchal cord reduced Infant was bulb suctioned prior to delivering in toto. Cord doubly clamped & cut. Infant handed to awaiting mother. Placenta delivered spontaneously, intact and normal in appearance with 3 vessel cord. No perineal, sulcus, periuretheral, nor cervical lacerations noted. Mother in stable condition.    Maternal Anesthesia: epidural       Delivery Complications:  none    Neonatologist Present: no    Placenta info:  Date/Time of Delivery: 2025  9:44 PM   Delivery: spontaneous  Placenta to Pathology: no    Cord info:  Cord Gases Submitted: no  Cord Blood Collection: no  Cord Tissue Collection: no  Cord Complications: single nuchal    Sponge and Needle Counts:  Verified    QBL:  100 ml    Keo Diaz DO   2025  10:33 PM

## 2025-06-26 NOTE — ANESTHESIA POSTPROCEDURE EVALUATION
Patient: Uma Umaña    Procedure Summary       Date: 06/25/25 Room / Location:     Anesthesia Start: 2035 Anesthesia Stop: 2144    Procedure: LABOR ANALGESIA Diagnosis:     Scheduled Providers:  Anesthesiologist: Pantera Scanlon MD    Anesthesia Type: epidural ASA Status: 2 - Emergent            Anesthesia Type: epidural    Vitals Value Taken Time   /61 06/25/25 22:43   Temp 98.9 °F (37.2 °C) 06/25/25 22:30   Pulse 79 06/25/25 22:43   Resp 19 06/25/25 22:30   SpO2 99 % 06/25/25 21:40       [facility] AN Post Evaluation:   Patient Evaluated in PACU  Patient Participation: complete - patient participated  Level of Consciousness: awake  Pain Management: adequate  Airway Patency:patent  Dental exam unchanged from preop  Yes    Cardiovascular Status: acceptable  Respiratory Status: acceptable  Postoperative Hydration acceptable      Pantera Scanlon MD  6/26/2025 1:59 AM

## 2025-06-26 NOTE — ANESTHESIA PREPROCEDURE EVALUATION
Anesthesia PreOp Note    HPI:     Uma Umaña is a 39 year old female who presents for preoperative consultation requested by: * No surgeons listed *    Date of Surgery: 2025    * No procedures listed *  Indication: * No pre-op diagnosis entered *    Relevant Problems   No relevant active problems       NPO:                         History Review:  Patient Active Problem List    Diagnosis Date Noted    Full-term PROM with onset of labor within 24 hours of rupture ([facility]) 2025    Pregnancy ([facility]) 2025    Threatened  labor ([facility]) 05/15/2025    Iron deficiency anemia during pregnancy ([facility]) 2025    Anemia during pregnancy in second trimester ([facility]) 2025    AMA (advanced maternal age) multigravida 35+ ([facility]) 2021       Past Medical History[1]    Past Surgical History[2]    Prescriptions Prior to Admission[3]  Current Medications and Prescriptions Ordered in Epic[4]    Allergies[5]    Family History[6]  Social Hx on file[7]    Available pre-op labs reviewed.  Lab Results   Component Value Date    WBC 14.6 (H) 2025    RBC 4.29 2025    HGB 13.1 2025    HCT 38.1 2025    MCV 88.8 2025    MCH 30.5 2025    MCHC 34.4 2025    RDW 19.9 (H) 2025    .0 2025             Vital Signs:  Body mass index is 30 kg/m².   weight is 74.4 kg (164 lb). Her oral temperature is 97.7 °F (36.5 °C). Her blood pressure is 110/63 and her pulse is 85. Her respiration is 19 and oxygen saturation is 100%.   Vitals:    25 2100   BP: 106/85 106/63 110/63 110/63   Pulse: 96 95 85 85   Resp:       Temp:       TempSrc:       SpO2:  98% 100% 100%   Weight:            Anesthesia Evaluation     Patient summary reviewed and Nursing notes reviewed    Airway   Mallampati: II  TM distance: >3 FB  Neck ROM: full  Dental      Pulmonary - normal exam   (+) asthma  Cardiovascular - negative ROS and normal exam     Neuro/Psych - negative ROS     GI/Hepatic/Renal - negative ROS     Endo/Other    Abdominal                  Anesthesia Plan:   ASA:  2  Emergent    Plan:   Epidural  Post-op Pain Management: IV analgesics  Informed Consent Plan and Risks Discussed With:  Patient  Use of Blood Products Discussed With:  Patient  Blood Product Use Consented    Discussed plan with:  Surgeon      I have informed Uma Cindy Umaña and/or legal guardian or family member of the nature of the anesthetic plan, benefits, risks including possible dental damage if relevant, major complications, and any alternative forms of anesthetic management.   All of the patient's questions were answered to the best of my ability. The patient desires the anesthetic management as planned.  Pantera Scanlon MD  6/25/2025 9:13 PM  Present on Admission:   Full-term PROM with onset of labor within 24 hours of rupture (HCC)           [1]   Past Medical History:   Asthma (HCC)    In her teens-exercise induced    Human papilloma virus infection   [2]   Past Surgical History:  Procedure Laterality Date    Colposcopy, cervix w/upper adjacent vagina; w/biopsy(s), cervix  2012   [3]   Medications Prior to Admission   Medication Sig Dispense Refill Last Dose/Taking    Ferrous Sulfate ER (SLOW FE) 45 MG Oral Tab CR    6/24/2025    prenatal multivitamin plus DHA 27-0.8-228 MG Oral Cap Take 1 capsule by mouth in the morning.   6/25/2025    loratadine 10 MG Oral Tab Take 1 tablet (10 mg total) by mouth in the morning.   Unknown   [4]   Current Facility-Administered Medications Ordered in Epic   Medication Dose Route Frequency Provider Last Rate Last Admin    dextrose in lactated ringers 5% infusion   Intravenous Continuous Keo Diaz DO        lactated ringers infusion   Intravenous PRN Keo Diaz DO        lactated ringers IV bolus 500 mL  500 mL Intravenous PRN Keo Diaz DO        acetaminophen (Tylenol Extra Strength) tab 500 mg  500 mg Oral Q6H  PRN Keo Diaz, DO        acetaminophen (Tylenol Extra Strength) tab 1,000 mg  1,000 mg Oral Q6H PRN Keo Diaz, DO        ibuprofen (Motrin) tab 600 mg  600 mg Oral Once PRN Keo Diaz, DO        ondansetron (Zofran) 4 MG/2ML injection 4 mg  4 mg Intravenous Q6H PRN Keo Diaz, DO        oxyTOCIN in sodium chloride 0.9% (Pitocin) 30 Units/500mL infusion premix  62.5-900 marlene-units/min Intravenous Continuous Keo Diaz, DO        terbutaline (Brethine) 1 MG/ML injection 0.25 mg  0.25 mg Subcutaneous PRN Keo Diaz, DO        sodium citrate-citric acid (Bicitra) 500-334 MG/5ML oral solution 30 mL  30 mL Oral PRN Keo Diaz, DO        lidocaine PF (Xylocaine-MPF) 1% injection  30 mL Intradermal Once Keo Diaz, DO        fentaNYL (Sublimaze) 50 mcg/mL injection 50 mcg  50 mcg Intravenous Q30 Min PRN Keo Diaz, DO        oxyTOCIN in sodium chloride 0.9% (Pitocin) 30 Units/500mL infusion premix  0.5-20 marlene-units/min Intravenous Continuous Keo Diaz, DO        fentaNYL-bupivacaine 2 mcg/mL-0.125% in sodium chloride 0.9% 100 mL EPIDURAL infusion premix   Epidural Continuous Pantera Scanlon MD        fentaNYL (Sublimaze) 50 mcg/mL injection 100 mcg  100 mcg Epidural Once Pantera Scanlon MD        bupivacaine PF (Marcaine) 0.25% injection  20 mL Epidural Once Pantera Scanlon MD        ePHEDrine (PF) 25 MG/5 ML injection 5 mg  5 mg Intravenous PRN Pantera Scanlon MD        nalbuphine (Nubain) 10 mg/mL injection 2.5 mg  2.5 mg Intravenous Q15 Min PRN Pantera Scanlon MD        lidocaine PF (Xylocaine-MPF) 1 % injection             oxyTOCIN in sodium chloride 0.9% (Pitocin) 30 Units/500mL infusion premix             fentaNYL (Sublimaze) 50 mcg/mL injection              No current Epic-ordered outpatient medications on file.   [5]   Allergies  Allergen Reactions    Meperidine HIVES     Other reaction(s): Hives/Urticaria      Sulfa  Antibiotics DIZZINESS     Other reaction(s): Other (See Comments)      Sulfamethoxazole W/Trimethoprim DIZZINESS     Other reaction(s): Dizziness     [6]   Family History  Problem Relation Age of Onset    Heart Attack Father     Prostate Cancer Father     Hypertension Mother     Lipids Mother     Other (Non-hodgkins lymphoma) Mother     Other (Parkinsons) Maternal Grandmother     Heart Disease Paternal Grandmother     Heart Disease Paternal Grandfather    [7]   Social History  Socioeconomic History    Marital status:    Tobacco Use    Smoking status: Former     Current packs/day: 0.00     Types: Cigarettes     Quit date: 2009     Years since quittin.3     Passive exposure: Never    Smokeless tobacco: Never    Tobacco comments:     Smoked socially.     Vaping Use    Vaping status: Never Used   Substance and Sexual Activity    Alcohol use: Not Currently     Comment: Socially. Stopped when found out was pregnant     Drug use: Never

## 2025-06-26 NOTE — PLAN OF CARE
Patient received into room 361 via wheelchair. Beside report received from ARMANDO Ulloa. Patient transferred to bed without incident. Bed in locked and low position. Side rails up x 2. VSS. Fundus firm at u/1, lochia small, no clots noted. IV intact. Pain level 0/10. Baby present at bedside in open crib, ID bands checked and verified. Patient/family oriented to unit, room and call light. Call light within patient reach. Reinforced to patient to call for assistance before getting out of bed to bathroom. Plan of care reviewed. Will continue to monitor per protocol.    Problem: PAIN - ADULT  Goal: Verbalizes/displays adequate comfort level or patient's stated pain goal  Description: INTERVENTIONS:  - Encourage pt to monitor pain and request assistance  - Assess pain using appropriate pain scale  - Administer analgesics based on type and severity of pain and evaluate response  - Implement non-pharmacological measures as appropriate and evaluate response  - Consider cultural and social influences on pain and pain management  - Manage/alleviate anxiety  - Utilize distraction and/or relaxation techniques  - Monitor for opioid side effects  - Notify MD/LIP if interventions unsuccessful or patient reports new pain  - Anticipate increased pain with activity and pre-medicate as appropriate  Outcome: Progressing     Problem: ANXIETY  Goal: Will report anxiety at manageable levels  Description: INTERVENTIONS:  - Administer medication as ordered  - Teach and rehearse alternative coping skills  - Provide emotional support with 1:1 interaction with staff  Outcome: Progressing     Problem: POSTPARTUM  Goal: Long Term Goal:Experiences normal postpartum course  Description: INTERVENTIONS:  - Assess and monitor vital signs and lab values.  - Assess fundus and lochia.  - Provide ice/sitz baths for perineum discomfort.  - Monitor healing of incision/episiotomy/laceration, and assess for signs and symptoms of infection and hematoma.  -  Assess bladder function and monitor for bladder distention.  - Provide/instruct/assist with pericare as needed.  - Provide VTE prophylaxis as needed.  - Monitor bowel function.  - Encourage ambulation and provide assistance as needed.  - Assess and monitor emotional status and provide social service/psych resources as needed.  - Utilize standard precautions and use personal protective equipment as indicated. Ensure aseptic care of all intravenous lines and invasive tubes/drains.  - Obtain immunization and exposure to communicable diseases history.  Outcome: Progressing  Goal: Optimize infant feeding at the breast  Description: INTERVENTIONS:  - Initiate breast feeding within first hour after birth.   - Monitor effectiveness of current breast feeding efforts.  - Assess support systems available to mother/family.  - Identify cultural beliefs/practices regarding lactation, letdown techniques, maternal food preferences.  - Assess mother's knowledge and previous experience with breast feeding.  - Provide information as needed about early infant feeding cues (e.g., rooting, lip smacking, sucking fingers/hand) versus late cue of crying.  - Discuss/demonstrate breast feeding aids (e.g., infant sling, nursing footstool/pillows, and breast pumps).  - Encourage mother/other family members to express feelings/concerns, and actively listen.  - Educate father/SO about benefits of breast feeding and how to manage common lactation challenges.  - Recommend avoidance of specific medications or substances incompatible with breast feeding.  - Assess and monitor for signs of nipple pain/trauma.  - Instruct and provide assistance with proper latch.  - Review techniques for milk expression (breast pumping) and storage of breast milk. Provide pumping equipment/supplies, instructions and assistance, as needed.  - Encourage rooming-in and breast feeding on demand.  - Encourage skin-to-skin contact.  - Provide LC support as needed.  - Assess  for and manage engorgement.  - Provide breast feeding education handouts and information on community breast feeding support.   Outcome: Progressing  Goal: Establishment of adequate milk supply with medication/procedure interruptions  Description: INTERVENTIONS:  - Review techniques for milk expression (breast pumping).   - Provide pumping equipment/supplies, instructions, and assistance until it is safe to breastfeed infant.  Outcome: Progressing  Goal: Appropriate maternal -  bonding  Description: INTERVENTIONS:  - Assess caregiver- interactions.  - Assess caregiver's emotional status and coping mechanisms.  - Encourage caregiver to participate in  daily care.  - Assess support systems available to mother/family.  - Provide /case management support as needed.  Outcome: Progressing

## 2025-06-26 NOTE — H&P
[facility]  part of [facility]    History & Physical    Uma Umaña Patient Status:  Inpatient    1985 MRN [id number]   Location [facility] Attending Keo Diaz, DO   Hosp Day # 0 PCP Smith Mccabe MD     Date of Admission:  2025      HPI:   Uma Umaña is a 39 year old  female, current EGA of 39w0d with an estimated date of delivery of: 2025, by Last Menstrual Period      Uma Umaña is being admitted for labor management.    Her current obstetrical history is significant for AMA and anemia-resolved. She is Rh positive, rubella immune, serology and gbs negative. No HTN, GDM or current anemia. Patient presented for routine NST and RN noted regular uterine contractions and leakage of fluid from 1000 today. Spec confirmed PROM and advance dilation.    Patient reports contractions since this afternoon .     Fetal Movement: good    History   Obstetric History:   OB History    Para Term  AB Living   2 1 1   1   SAB IAB Ectopic Multiple Live Births      0 1      # Outcome Date GA Lbr Brendan/2nd Weight Sex Type Anes PTL Lv   2 Current            1 Term 09/15/21 38w1d 03:56 / 01:01 7 lb 6 oz (3.345 kg) F NORMAL SPONT EPI N MATA     Past Medical History:   Past Medical History:    Asthma (HCC)    In her teens-exercise induced    Human papilloma virus infection     Past Surgerical History:   Past Surgical History:   Procedure Laterality Date    Colposcopy, cervix w/upper adjacent vagina; w/biopsy(s), cervix       Social History:   Social History     Tobacco Use    Smoking status: Former     Current packs/day: 0.00     Types: Cigarettes     Quit date: 2009     Years since quittin.3     Passive exposure: Never    Smokeless tobacco: Never    Tobacco comments:     Smoked socially.     Substance Use Topics    Alcohol use: Not Currently     Comment: Socially. Stopped when found out was pregnant         Allergies/Medications:    Allergies:   Allergies   Allergen Reactions    Meperidine HIVES     Other reaction(s): Hives/Urticaria      Sulfa Antibiotics DIZZINESS     Other reaction(s): Other (See Comments)      Sulfamethoxazole W/Trimethoprim DIZZINESS     Other reaction(s): Dizziness       Medications:  Medications Prior to Admission   Medication Sig Dispense Refill Last Dose/Taking    prenatal multivitamin plus DHA 27-0.8-228 MG Oral Cap Take 1 capsule by mouth in the morning.   2025    Ferrous Sulfate ER (SLOW FE) 45 MG Oral Tab CR    Unknown    loratadine 10 MG Oral Tab Take 1 tablet (10 mg total) by mouth in the morning.   Unknown       Review of Systems:   As documented in HPI      Physical Exam:   Temp:  [98.4 °F (36.9 °C)] 98.4 °F (36.9 °C)  Pulse:  [94] 94  Resp:  [18] 18  BP: (126)/(82) 126/82    Constitutional: alert and cooperative in moderate distress  Abdomen: gravid nontender  Vaginal exam:  Dilation: 6.5 cm    Effacement: 80 %    Station: -2    Position: Cephalic    Triage RN exam    FHT assessment:   Baseline: 140 bpm   Variability: moderate   Accels:  present   Decels: No   Tocos:  contractions every 3 minute   Category: 1 tracing    Results:     Labs ordered and need to be collected       Assessment/Plan:     Full-term PROM with onset of labor within 24 hours of rupture (HCC)           Active phase labor.    Treatment Plan:  Intervention: expectant management and augmentation if necessary. Anticipate .    Keo Diaz DO  2025  8:02 PM

## 2025-06-26 NOTE — ANESTHESIA PROCEDURE NOTES
Labor Analgesia    Date/Time: 6/25/2025 8:35 PM    Performed by: Pantera Scanlon MD  Authorized by: Pantera Scanlon MD      General Information and Staff    Start Time:  6/25/2025 8:35 PM  End Time:  6/25/2025 8:45 PM  Anesthesiologist:  Pantera Scanlon MD  Performed by:  Anesthesiologist  Patient Location:  OB  Site Identification: surface landmarks    Reason for Block: labor epidural    Preanesthetic Checklist: patient identified, IV checked, risks and benefits discussed, monitors and equipment checked, pre-op evaluation, timeout performed, anesthesia consent and sterile technique used      Procedure Details    Patient Position:  Sitting  Prep: Betadine and patient draped    Monitoring:  Heart rate, cardiac monitor and continuous pulse ox  Approach:  Midline    Epidural Needle    Injection Technique:  ADRIANO air  Needle Type:  Tuohy  Needle Gauge:  18 G  Needle Length:  3.375 in  Needle Insertion Depth:  5  Location:  L3-4    Spinal Needle      Catheter    Catheter Type:  Side hole  Catheter Size:  20 G  Catheter at Skin Depth:  13    Assessment    Sensory Level:  T8    Additional Comments

## 2025-06-26 NOTE — DISCHARGE SUMMARY
[facility]  part of [facility]    Discharge Summary    Uma Umaña Patient Status:  Inpatient    1985 MRN [id number]   Location [facility] Attending Keo Diaz DO   Hosp Day # 0       Admit date:  2025    Discharge date: 2025    Delivering OB Clinician: Joe    EDC: Estimated Date of Delivery: 25    Gestational Age: 39w0d    Antepartum complications:   Patient Active Problem List   Diagnosis    AMA (advanced maternal age) multigravida 35+ (HCC)    Anemia during pregnancy in second trimester (HCC)    Iron deficiency anemia during pregnancy (HCC)    Threatened  labor (HCC)    Full-term PROM with onset of labor within 24 hours of rupture ([facility])    Pregnancy ([facility])       Date of Delivery: 2025 Time of Delivery: 9:41 PM    Delivery Type: spontaneous vaginal delivery    Baby: Liveborn male   Information for the patient's :  Casey Umaña [[id number]]   7 lb 11.1 oz (3.49 kg)  Apgars:  1 minute: 9  5 minutes: 910 minutes:    Romeo Hoyt     Intrapartum Complications: None      Hospital Course: No complications. Full term PROM with onset active labor. Epidural, AROM forebag and progressed to . No lacerations. Routine delivery and postpartum care    Discharge Physical Exam:   /63   Pulse 85   Temp 97.7 °F (36.5 °C) (Oral)   Resp 19   Wt 164 lb (74.4 kg)   LMP 2024 (Exact Date)   SpO2 100%   Breastfeeding Unknown   BMI 30.00 kg/m²   General appearance:  alert, appears stated age, cooperative and no distress  Abdominal: soft, non-tender, no rebound  Uterus: firm, nontender, below umbilicus  Pelvic: deferred  Extremities: Homans sign is negative, no sign of DVT    Discharged Condition: stable    Disposition: home with self care    Plan:     Follow-up appointment in 6 weeks with Dr. Diaz

## 2025-06-26 NOTE — PROGRESS NOTES
Patient up to bathroom with assist x 2.  Unable to void at this time. Patient transferred to mother/baby room 362 per wheelchair in stable condition with baby and personal belongings.  Accompanied by significant other and staff.  Report given to mother/baby ARMANDO Felix.

## 2025-06-26 NOTE — PROGRESS NOTES
[facility]  part of [facility]    OB/Gyne Post  Progress Note      Uma Umaña Patient Status:  Inpatient    1985 MRN [id number]   Location [facility] 3SE Attending Keo Diaz, DO   Hosp Day # 1 PCP Smith Mccabe MD       Subjective     Good pain control. Tolerating present diet. Ambulating well. Voiding freely.  She denies headache, fever, chest pain, shortness of breath, dizziness upon ambulation nor leg pain.     Objective   Vital signs in last 24 hours:  Vitals:    25 0015 25 0200 25 0600 25 0815   BP: 112/69 109/64 110/78 116/77   Pulse: 82 73 79 75   Resp: 16 16 18 16   Temp: 98.8 °F (37.1 °C) 98.6 °F (37 °C) 97.8 °F (36.6 °C) 98.2 °F (36.8 °C)   TempSrc: Oral Oral Oral Oral   SpO2:       Weight:            Input/Output:    Intake/Output Summary (Last 24 hours) at 2025 1803  Last data filed at 2025 0549  Gross per 24 hour   Intake --   Output 1600 ml   Net -1600 ml       AVSS  Constitutional: comfortable  Abdomen: soft nontender  Uterus: fundus below umbilicus, appropriately tender  Extremities: No calf tenderness, SCDs on while in bed, 2+ pitting edema.      Results:   Labs / Path / Radiology:    Recent Results (from the past 24 hours)   CBC With Differential With Platelet    Collection Time: 25  8:00 PM   Result Value Ref Range    WBC 14.6 (H) 4.0 - 11.0 x10(3) uL    RBC 4.29 3.80 - 5.30 x10(6)uL    HGB 13.1 12.0 - 16.0 g/dL    HCT 38.1 35.0 - 48.0 %    MCV 88.8 80.0 - 100.0 fL    MCH 30.5 26.0 - 34.0 pg    MCHC 34.4 31.0 - 37.0 g/dL    RDW-SD 63.3 (H) 35.1 - 46.3 fL    RDW 19.9 (H) 11.0 - 15.0 %    .0 150.0 - 450.0 10(3)uL    Neutrophil Absolute Prelim 10.98 (H) 1.50 - 7.70 x10 (3) uL    Neutrophil Absolute 10.98 (H) 1.50 - 7.70 x10(3) uL    Lymphocyte Absolute 2.46 1.00 - 4.00 x10(3) uL    Monocyte Absolute 0.93 0.10 - 1.00 x10(3) uL    Eosinophil Absolute 0.03 0.00 - 0.70 x10(3) uL    Basophil Absolute 0.04  0.00 - 0.20 x10(3) uL    Immature Granulocyte Absolute 0.13 0.00 - 1.00 x10(3) uL    Neutrophil % 75.3 %    Lymphocyte % 16.9 %    Monocyte % 6.4 %    Eosinophil % 0.2 %    Basophil % 0.3 %    Immature Granulocyte % 0.9 %   T Pallidum Screening Cascade    Collection Time: 25  8:00 PM   Result Value Ref Range    Treponemal Antibodies Nonreactive Nonreactive    ABORH (Blood Type)    Collection Time: 25  8:00 PM   Result Value Ref Range    ABO BLOOD TYPE O     RH BLOOD TYPE Positive    Antibody Screen    Collection Time: 25  8:00 PM   Result Value Ref Range    Antibody Screen Negative    CBC With Differential With Platelet    Collection Time: 25  7:48 AM   Result Value Ref Range    WBC 15.3 (H) 4.0 - 11.0 x10(3) uL    RBC 3.93 3.80 - 5.30 x10(6)uL    HGB 12.2 12.0 - 16.0 g/dL    HCT 36.7 35.0 - 48.0 %    MCV 93.4 80.0 - 100.0 fL    MCH 31.0 26.0 - 34.0 pg    MCHC 33.2 31.0 - 37.0 g/dL    RDW-SD 67.3 (H) 35.1 - 46.3 fL    RDW 19.9 (H) 11.0 - 15.0 %    .0 150.0 - 450.0 10(3)uL    Neutrophil Absolute Prelim 11.89 (H) 1.50 - 7.70 x10 (3) uL    Neutrophil Absolute 11.89 (H) 1.50 - 7.70 x10(3) uL    Lymphocyte Absolute 1.89 1.00 - 4.00 x10(3) uL    Monocyte Absolute 1.29 (H) 0.10 - 1.00 x10(3) uL    Eosinophil Absolute 0.08 0.00 - 0.70 x10(3) uL    Basophil Absolute 0.04 0.00 - 0.20 x10(3) uL    Immature Granulocyte Absolute 0.15 0.00 - 1.00 x10(3) uL    Neutrophil % 77.5 %    Lymphocyte % 12.3 %    Monocyte % 8.4 %    Eosinophil % 0.5 %    Basophil % 0.3 %    Immature Granulocyte % 1.0 %   RBC Morphology Scan    Collection Time: 25  7:48 AM   Result Value Ref Range    RBC Morphology See morphology below (A) Normal, Slide reviewed, see previous RBC morphology.    Platelet Morphology Normal Normal    Acanthocytes, Spur Cells 1+      Ovalocytes 1+         Specimens (From admission, onward)      None            No results found.      Assessment/Plan              39 year oldyo  , s/p  spontaneous vaginal, PPD# 1     Patient Active Problem List   Diagnosis    AMA (advanced maternal age) multigravida 35+ ([facility])    Anemia during pregnancy in second trimester ([facility])    Iron deficiency anemia during pregnancy ([facility])    Threatened  labor ([facility])    Full-term PROM with onset of labor within 24 hours of rupture ([facility])    Pregnancy ([facility])   .    ambulate, continue routine postpartum care      The patient had a male infant, and does desire circumcision.  She was consented for infant circumcision risks including, but not limited to, bleeding, infection, trauma to other tissue, and need for further procedures.  The patient expressed understanding, denied questions, and wishes to proceed with the procedure for her son.      Marnie Mccurdy MD  2025  6:03 PM

## 2025-06-27 VITALS
BODY MASS INDEX: 30 KG/M2 | DIASTOLIC BLOOD PRESSURE: 80 MMHG | WEIGHT: 164 LBS | SYSTOLIC BLOOD PRESSURE: 104 MMHG | TEMPERATURE: 98 F | HEART RATE: 74 BPM | OXYGEN SATURATION: 99 % | RESPIRATION RATE: 16 BRPM

## 2025-06-27 RX ORDER — IBUPROFEN 600 MG/1
600 TABLET, FILM COATED ORAL EVERY 6 HOURS
Qty: 30 TABLET | Refills: 0 | Status: SHIPPED | OUTPATIENT
Start: 2025-06-27

## 2025-06-27 NOTE — PROGRESS NOTES
[facility]  part of [facility]    Post  Progress Note      Uma Umaña Patient Status:  Inpatient    1985 MRN [id number]   Location [facility] 3SE Attending Keo Diaz, DO   Hosp Day # 2 PCP Smith Mccabe MD       Subjective     Good pain control.  No c/o    Objective   Vital signs in last 24 hours:  Temp:  [98.3 °F (36.8 °C)-98.4 °F (36.9 °C)] 98.3 °F (36.8 °C)  Pulse:  [71-74] 74  Resp:  [16] 16  BP: (104-111)/(68-80) 104/80    Physical Exam:  Constitutional: comfortable  Abdomen: soft, nontender  Uterus: fundus firm and nontender  Extremities: No calf tenderness      Results:   CBC:    Lab Results   Component Value Date    WBC 15.3 (H) 2025    WBC 14.6 (H) 2025    WBC 10.1 2025     Lab Results   Component Value Date    HGB 12.2 2025    HGB 13.1 2025    HGB 12.7 2025      Lab Results   Component Value Date    .0 2025    .0 2025    .0 2025        Assessment/Plan   PPD #  2 --- stable    Home  RTC 6 week  Rx Motrin 600 mg  PNV          Jessi Werner MD  2025  9:33 AM

## 2025-07-23 ENCOUNTER — TELEPHONE (OUTPATIENT)
Dept: OBGYN CLINIC | Facility: CLINIC | Age: 40
End: 2025-07-23

## 2025-07-23 DIAGNOSIS — R39.198 DIFFICULTY IN URINATION: Primary | ICD-10-CM

## 2025-07-23 NOTE — TELEPHONE ENCOUNTER
Patient informed of Dr. Werner recommendations below. Patient verbalized understanding. Informed patient Dr. Diaz does not have a sooner appointment available and message will be forwarded to ask if patient can be added. Patient is aware Dr. Diaz will be back in clinic on 7/28/25.

## 2025-07-23 NOTE — TELEPHONE ENCOUNTER
Per WebEx, Dr. Werner adds that patient can also do UA, continue with scheduled voids and move up postpartum visit. See Dr. Werner additional recommendations below.  Left message to call back.

## 2025-07-24 ENCOUNTER — LAB ENCOUNTER (OUTPATIENT)
Dept: LAB | Facility: HOSPITAL | Age: 40
End: 2025-07-24
Attending: OBSTETRICS & GYNECOLOGY
Payer: COMMERCIAL

## 2025-07-24 DIAGNOSIS — R39.198 DIFFICULTY IN URINATION: ICD-10-CM

## 2025-07-24 LAB
BILIRUB UR QL: NEGATIVE
CLARITY UR: CLEAR
GLUCOSE UR-MCNC: NORMAL MG/DL
KETONES UR-MCNC: NEGATIVE MG/DL
LEUKOCYTE ESTERASE UR QL STRIP.AUTO: NEGATIVE
NITRITE UR QL STRIP.AUTO: NEGATIVE
PH UR: 6 [PH] (ref 5–8)
PROT UR-MCNC: NEGATIVE MG/DL
RBC #/AREA URNS AUTO: >10 /HPF
SP GR UR STRIP: 1.01 (ref 1–1.03)
UROBILINOGEN UR STRIP-ACNC: NORMAL

## 2025-07-24 PROCEDURE — 81001 URINALYSIS AUTO W/SCOPE: CPT

## 2025-07-24 PROCEDURE — 87086 URINE CULTURE/COLONY COUNT: CPT

## 2025-07-24 NOTE — TELEPHONE ENCOUNTER
Component  Ref Range & Units (hover) 7/24/25 10:58 AM   Urine Color Light-Yellow   Clarity Urine Clear   Spec Gravity 1.015   Glucose Urine Normal   Bilirubin Urine Negative   Ketones Urine Negative   Blood Urine 2+ Abnormal    pH Urine 6.0   Protein Urine Negative   Urobilinogen Urine Normal   Nitrite Urine Negative   Leukocyte Esterase Urine Negative   WBC Urine 6-10 Abnormal    RBC Urine >10 Abnormal    Bacteria Urine None Seen   Squamous Epi. Cells Few Abnormal    Renal Tubular Epithelial Cells None Seen   Transitional Cells None Seen   Yeast Urine None Seen   Resulting Agency [facility] ([facility])        Uma still having with no sensation to urinate. Informed UA does not show signs of infection at this time.   Encouraged times bladder emptying and hydration.   Informed to reach out is notes dribbling, flank pain, body aches, chills or fever.   Informed will discuss with Dr. Diaz when he returns on Monday about seeing her for postpartum problem visit. She states understanding.     To Dr. Bishop on-call for review and sign-off.   To Dr. Diaz, please advise on where patient can be added upon return. Thank you both.

## 2025-07-28 ENCOUNTER — TELEPHONE (OUTPATIENT)
Dept: OBGYN CLINIC | Facility: CLINIC | Age: 40
End: 2025-07-28

## 2025-07-28 ENCOUNTER — POSTPARTUM (OUTPATIENT)
Dept: OBGYN CLINIC | Facility: CLINIC | Age: 40
End: 2025-07-28

## 2025-07-28 VITALS — WEIGHT: 133 LBS | BODY MASS INDEX: 24 KG/M2

## 2025-07-28 DIAGNOSIS — N39.41 URGE INCONTINENCE: Primary | ICD-10-CM

## 2025-07-28 NOTE — PROGRESS NOTES
Subjective:   Patient ID: Uma Umaña is a 39 year old female.    HPI   and S/P  on . Review of chart states a 6 minute second stage. No lacerations. Postpartum course significant for some urinary urgency but also inability to tell if her bladder is full. She has been going to washroom q 2-4 hours while awake and will have some intermittent  incontinence when about to initiate urination. No frequent DAVID but if happens, it would would more often in seated position. UA and culture from last week are negative for infection.     History/Other:   Review of Systems   Genitourinary:  Negative for urgency.     Current Outpatient Medications   Medication Sig Dispense Refill    prenatal multivitamin plus DHA 27-0.8-228 MG Oral Cap Take 1 capsule by mouth in the morning.      ibuprofen 600 MG Oral Tab Take 1 tablet (600 mg total) by mouth every 6 (six) hours. (Patient not taking: Reported on 2025) 30 tablet 0    Ferrous Sulfate ER (SLOW FE) 45 MG Oral Tab CR  (Patient not taking: Reported on 2025)      loratadine 10 MG Oral Tab Take 1 tablet (10 mg total) by mouth in the morning. (Patient not taking: Reported on 2025)       Allergies:  Allergies   Allergen Reactions    Meperidine HIVES     Other reaction(s): Hives/Urticaria      Sulfa Antibiotics DIZZINESS     Other reaction(s): Other (See Comments)      Sulfamethoxazole W/Trimethoprim DIZZINESS     Other reaction(s): Dizziness         Objective:   Physical Exam  Constitutional:       General: She is not in acute distress.     Appearance: She is normal weight. She is not ill-appearing.   Neurological:      Mental Status: She is alert.         Assessment & Plan:   1. Urge incontinence            Meds This Visit:  Requested Prescriptions      No prescriptions requested or ordered in this encounter       Imaging & Referrals:  Pelvic floor physical therapy discussed and referral placed.

## 2025-07-28 NOTE — TELEPHONE ENCOUNTER
Urine culture negative. Patient still having to do timed voiding d/t lack of urge. Accepts appointment with Dr. Diaz today at 1110 am.

## 2025-07-28 NOTE — TELEPHONE ENCOUNTER
Please place referral for pelvic floor phys therapy. She will seek it outside Located within [facility].

## 2025-07-28 NOTE — TELEPHONE ENCOUNTER
Pt stated she will try [facility] pelvic floor therapy first and if the wait is long to get in she will call back. Referral placed and pt provided with contact info.

## 2025-07-30 ENCOUNTER — TELEPHONE (OUTPATIENT)
Dept: OBGYN CLINIC | Facility: CLINIC | Age: 40
End: 2025-07-30

## 2025-07-30 DIAGNOSIS — R19.7 DIARRHEA OF PRESUMED INFECTIOUS ORIGIN: Primary | ICD-10-CM

## 2025-08-01 ENCOUNTER — LAB ENCOUNTER (OUTPATIENT)
Dept: LAB | Facility: HOSPITAL | Age: 40
End: 2025-08-01
Attending: OBSTETRICS & GYNECOLOGY

## 2025-08-01 DIAGNOSIS — R19.7 DIARRHEA OF PRESUMED INFECTIOUS ORIGIN: ICD-10-CM

## 2025-08-01 PROCEDURE — 87045 FECES CULTURE AEROBIC BACT: CPT

## 2025-08-01 PROCEDURE — 87046 STOOL CULTR AEROBIC BACT EA: CPT

## 2025-08-01 PROCEDURE — 87493 C DIFF AMPLIFIED PROBE: CPT

## 2025-08-01 PROCEDURE — 87427 SHIGA-LIKE TOXIN AG IA: CPT

## 2025-08-01 PROCEDURE — 87015 SPECIMEN INFECT AGNT CONCNTJ: CPT

## 2025-08-02 LAB — C DIFF TOX B STL QL: NEGATIVE

## 2025-08-18 ENCOUNTER — POSTPARTUM (OUTPATIENT)
Dept: OBGYN CLINIC | Facility: CLINIC | Age: 40
End: 2025-08-18

## 2025-08-18 VITALS
WEIGHT: 130 LBS | BODY MASS INDEX: 24 KG/M2 | SYSTOLIC BLOOD PRESSURE: 110 MMHG | HEART RATE: 82 BPM | DIASTOLIC BLOOD PRESSURE: 72 MMHG

## 2025-08-18 DIAGNOSIS — R19.7 DIARRHEA, UNSPECIFIED TYPE: ICD-10-CM

## 2025-08-18 PROBLEM — O09.529 AMA (ADVANCED MATERNAL AGE) MULTIGRAVIDA 35+ (HCC): Status: RESOLVED | Noted: 2021-03-24 | Resolved: 2025-08-18

## 2025-08-18 PROBLEM — Z34.90 PREGNANCY (HCC): Status: RESOLVED | Noted: 2025-06-25 | Resolved: 2025-08-18

## 2025-08-18 PROBLEM — O99.012 ANEMIA DURING PREGNANCY IN SECOND TRIMESTER (HCC): Status: RESOLVED | Noted: 2025-02-21 | Resolved: 2025-08-18

## 2025-08-18 PROBLEM — O47.00 THREATENED PRETERM LABOR (HCC): Status: RESOLVED | Noted: 2025-05-15 | Resolved: 2025-08-18

## 2025-08-18 PROCEDURE — 3078F DIAST BP <80 MM HG: CPT | Performed by: OBSTETRICS & GYNECOLOGY

## 2025-08-18 PROCEDURE — 3074F SYST BP LT 130 MM HG: CPT | Performed by: OBSTETRICS & GYNECOLOGY

## 2025-08-19 ENCOUNTER — TELEPHONE (OUTPATIENT)
Dept: OBGYN CLINIC | Facility: CLINIC | Age: 40
End: 2025-08-19

## 2025-08-19 ENCOUNTER — OFFICE VISIT (OUTPATIENT)
Dept: INTERNAL MEDICINE CLINIC | Facility: CLINIC | Age: 40
End: 2025-08-19

## 2025-08-19 VITALS
HEIGHT: 63 IN | BODY MASS INDEX: 24.27 KG/M2 | OXYGEN SATURATION: 93 % | RESPIRATION RATE: 18 BRPM | SYSTOLIC BLOOD PRESSURE: 101 MMHG | TEMPERATURE: 97 F | WEIGHT: 137 LBS | DIASTOLIC BLOOD PRESSURE: 68 MMHG | HEART RATE: 73 BPM

## 2025-08-19 DIAGNOSIS — R14.0 ABDOMINAL BLOATING: ICD-10-CM

## 2025-08-19 DIAGNOSIS — R19.7 DIARRHEA, UNSPECIFIED TYPE: Primary | ICD-10-CM

## 2025-08-19 DIAGNOSIS — K42.9 UMBILICAL HERNIA WITHOUT OBSTRUCTION AND WITHOUT GANGRENE: Primary | ICD-10-CM

## 2025-08-19 LAB
ALBUMIN SERPL-MCNC: 3.1 G/DL (ref 3.2–4.8)
ALBUMIN/GLOB SERPL: 1.4 (ref 1–2)
ALP LIVER SERPL-CCNC: 126 U/L (ref 37–98)
ALT SERPL-CCNC: 95 U/L (ref 10–49)
ANION GAP SERPL CALC-SCNC: 8 MMOL/L (ref 0–18)
AST SERPL-CCNC: 41 U/L (ref ?–34)
BILIRUB SERPL-MCNC: 0.2 MG/DL (ref 0.3–1.2)
BUN BLD-MCNC: 8 MG/DL (ref 9–23)
BUN/CREAT SERPL: 12.9 (ref 10–20)
CALCIUM BLD-MCNC: 7.3 MG/DL (ref 8.7–10.4)
CHLORIDE SERPL-SCNC: 109 MMOL/L (ref 98–112)
CO2 SERPL-SCNC: 27 MMOL/L (ref 21–32)
CREAT BLD-MCNC: 0.62 MG/DL (ref 0.55–1.02)
EGFRCR SERPLBLD CKD-EPI 2021: 116 ML/MIN/1.73M2 (ref 60–?)
GLOBULIN PLAS-MCNC: 2.2 G/DL (ref 2–3.5)
GLUCOSE BLD-MCNC: 61 MG/DL (ref 70–99)
IGA SERPL-MCNC: 506.7 MG/DL (ref 70–312)
OSMOLALITY SERPL CALC.SUM OF ELEC: 294 MOSM/KG (ref 275–295)
POTASSIUM SERPL-SCNC: 3.1 MMOL/L (ref 3.5–5.1)
PROT SERPL-MCNC: 5.3 G/DL (ref 5.7–8.2)
SODIUM SERPL-SCNC: 144 MMOL/L (ref 136–145)

## 2025-08-19 PROCEDURE — 3008F BODY MASS INDEX DOCD: CPT | Performed by: INTERNAL MEDICINE

## 2025-08-19 PROCEDURE — 3078F DIAST BP <80 MM HG: CPT | Performed by: INTERNAL MEDICINE

## 2025-08-19 PROCEDURE — 36415 COLL VENOUS BLD VENIPUNCTURE: CPT | Performed by: INTERNAL MEDICINE

## 2025-08-19 PROCEDURE — 3074F SYST BP LT 130 MM HG: CPT | Performed by: INTERNAL MEDICINE

## 2025-08-19 PROCEDURE — 99214 OFFICE O/P EST MOD 30 MIN: CPT | Performed by: INTERNAL MEDICINE

## 2025-08-20 DIAGNOSIS — R79.89 ELEVATED LFTS: ICD-10-CM

## 2025-08-20 DIAGNOSIS — R19.7 DIARRHEA, UNSPECIFIED TYPE: Primary | ICD-10-CM

## 2025-08-20 DIAGNOSIS — E87.6 HYPOKALEMIA: ICD-10-CM

## 2025-08-20 RX ORDER — POTASSIUM CHLORIDE 1500 MG/1
TABLET, EXTENDED RELEASE ORAL
Qty: 30 TABLET | Refills: 0 | Status: SHIPPED | OUTPATIENT
Start: 2025-08-20 | End: 2025-08-22

## 2025-08-21 ENCOUNTER — LAB ENCOUNTER (OUTPATIENT)
Dept: LAB | Facility: HOSPITAL | Age: 40
End: 2025-08-21
Attending: INTERNAL MEDICINE

## 2025-08-21 ENCOUNTER — OFFICE VISIT (OUTPATIENT)
Dept: SURGERY | Facility: CLINIC | Age: 40
End: 2025-08-21

## 2025-08-21 VITALS
WEIGHT: 137 LBS | DIASTOLIC BLOOD PRESSURE: 68 MMHG | HEART RATE: 78 BPM | SYSTOLIC BLOOD PRESSURE: 94 MMHG | BODY MASS INDEX: 24.27 KG/M2 | HEIGHT: 63 IN

## 2025-08-21 DIAGNOSIS — Z3A.32 32 WEEKS GESTATION OF PREGNANCY (HCC): ICD-10-CM

## 2025-08-21 DIAGNOSIS — R14.0 ABDOMINAL BLOATING: ICD-10-CM

## 2025-08-21 DIAGNOSIS — R79.89 ABNORMAL TSH: ICD-10-CM

## 2025-08-21 DIAGNOSIS — R19.7 DIARRHEA, UNSPECIFIED TYPE: ICD-10-CM

## 2025-08-21 DIAGNOSIS — K43.9 VENTRAL HERNIA WITHOUT OBSTRUCTION OR GANGRENE: Primary | ICD-10-CM

## 2025-08-21 LAB
BASOPHILS # BLD AUTO: 0.02 X10(3) UL (ref 0–0.2)
BASOPHILS NFR BLD AUTO: 0.4 %
CRP SERPL-MCNC: <0.5 MG/DL (ref ?–0.5)
DEPRECATED RDW RBC AUTO: 50.4 FL (ref 35.1–46.3)
EOSINOPHIL # BLD AUTO: 0.07 X10(3) UL (ref 0–0.7)
EOSINOPHIL NFR BLD AUTO: 1.3 %
ERYTHROCYTE [DISTWIDTH] IN BLOOD BY AUTOMATED COUNT: 15.1 % (ref 11–15)
ERYTHROCYTE [SEDIMENTATION RATE] IN BLOOD: 12 MM/HR (ref 0–20)
HCT VFR BLD AUTO: 37.1 % (ref 35–48)
HGB BLD-MCNC: 12.7 G/DL (ref 12–16)
IMM GRANULOCYTES # BLD AUTO: 0.02 X10(3) UL (ref 0–1)
IMM GRANULOCYTES NFR BLD: 0.4 %
LYMPHOCYTES # BLD AUTO: 1.89 X10(3) UL (ref 1–4)
LYMPHOCYTES NFR BLD AUTO: 36 %
MCH RBC QN AUTO: 31.9 PG (ref 26–34)
MCHC RBC AUTO-ENTMCNC: 34.2 G/DL (ref 31–37)
MCV RBC AUTO: 93.2 FL (ref 80–100)
MONOCYTES # BLD AUTO: 0.5 X10(3) UL (ref 0.1–1)
MONOCYTES NFR BLD AUTO: 9.5 %
NEUTROPHILS # BLD AUTO: 2.75 X10 (3) UL (ref 1.5–7.7)
NEUTROPHILS # BLD AUTO: 2.75 X10(3) UL (ref 1.5–7.7)
NEUTROPHILS NFR BLD AUTO: 52.4 %
PLATELET # BLD AUTO: 350 10(3)UL (ref 150–450)
RBC # BLD AUTO: 3.98 X10(6)UL (ref 3.8–5.3)
TSI SER-ACNC: 0.84 UIU/ML (ref 0.55–4.78)
TTG IGA SER-ACNC: >128 U/ML (ref ?–7)
WBC # BLD AUTO: 5.3 X10(3) UL (ref 4–11)

## 2025-08-21 PROCEDURE — 87272 CRYPTOSPORIDIUM AG IF: CPT

## 2025-08-21 PROCEDURE — 3078F DIAST BP <80 MM HG: CPT | Performed by: SURGERY

## 2025-08-21 PROCEDURE — 86140 C-REACTIVE PROTEIN: CPT

## 2025-08-21 PROCEDURE — 87015 SPECIMEN INFECT AGNT CONCNTJ: CPT

## 2025-08-21 PROCEDURE — 87045 FECES CULTURE AEROBIC BACT: CPT

## 2025-08-21 PROCEDURE — 99204 OFFICE O/P NEW MOD 45 MIN: CPT | Performed by: SURGERY

## 2025-08-21 PROCEDURE — 87046 STOOL CULTR AEROBIC BACT EA: CPT

## 2025-08-21 PROCEDURE — 84443 ASSAY THYROID STIM HORMONE: CPT

## 2025-08-21 PROCEDURE — 85652 RBC SED RATE AUTOMATED: CPT

## 2025-08-21 PROCEDURE — 3008F BODY MASS INDEX DOCD: CPT | Performed by: SURGERY

## 2025-08-21 PROCEDURE — 87427 SHIGA-LIKE TOXIN AG IA: CPT

## 2025-08-21 PROCEDURE — 87493 C DIFF AMPLIFIED PROBE: CPT

## 2025-08-21 PROCEDURE — 85025 COMPLETE CBC W/AUTO DIFF WBC: CPT

## 2025-08-21 PROCEDURE — 3074F SYST BP LT 130 MM HG: CPT | Performed by: SURGERY

## 2025-08-21 PROCEDURE — 36415 COLL VENOUS BLD VENIPUNCTURE: CPT

## 2025-08-21 PROCEDURE — 87329 GIARDIA AG IA: CPT

## 2025-08-22 ENCOUNTER — TELEPHONE (OUTPATIENT)
Dept: INTERNAL MEDICINE CLINIC | Facility: CLINIC | Age: 40
End: 2025-08-22

## 2025-08-22 LAB
C DIFF TOX B STL QL: NEGATIVE
CRYPTOSP AG STL QL IA: NEGATIVE
G LAMBLIA AG STL QL IA: NEGATIVE

## 2025-08-22 RX ORDER — POTASSIUM CHLORIDE 1500 MG/1
TABLET, EXTENDED RELEASE ORAL
Qty: 30 TABLET | Refills: 0 | Status: SHIPPED | OUTPATIENT
Start: 2025-08-22

## 2025-08-26 ENCOUNTER — HOSPITAL ENCOUNTER (OUTPATIENT)
Dept: CT IMAGING | Facility: HOSPITAL | Age: 40
Discharge: HOME | End: 2025-08-26
Attending: INTERNAL MEDICINE

## 2025-08-26 DIAGNOSIS — R19.7 DIARRHEA, UNSPECIFIED TYPE: ICD-10-CM

## 2025-08-26 DIAGNOSIS — R14.0 ABDOMINAL BLOATING: ICD-10-CM

## 2025-08-26 PROCEDURE — 74176 CT ABD & PELVIS W/O CONTRAST: CPT | Performed by: INTERNAL MEDICINE

## 2025-08-26 RX ORDER — POTASSIUM CHLORIDE 1500 MG/1
TABLET, EXTENDED RELEASE ORAL
Qty: 30 TABLET | Refills: 0 | OUTPATIENT
Start: 2025-08-26

## 2025-08-27 ENCOUNTER — OFFICE VISIT (OUTPATIENT)
Facility: CLINIC | Age: 40
End: 2025-08-27

## 2025-08-27 VITALS
DIASTOLIC BLOOD PRESSURE: 67 MMHG | WEIGHT: 135 LBS | HEART RATE: 76 BPM | HEIGHT: 63 IN | BODY MASS INDEX: 23.92 KG/M2 | SYSTOLIC BLOOD PRESSURE: 101 MMHG

## 2025-08-27 DIAGNOSIS — K52.9 CHRONIC DIARRHEA: Primary | ICD-10-CM

## 2025-08-27 DIAGNOSIS — R89.9 ABNORMAL LABORATORY TEST: ICD-10-CM

## 2025-08-27 DIAGNOSIS — D50.9 IRON DEFICIENCY ANEMIA, UNSPECIFIED IRON DEFICIENCY ANEMIA TYPE: ICD-10-CM

## 2025-08-27 PROCEDURE — 99204 OFFICE O/P NEW MOD 45 MIN: CPT

## 2025-08-27 PROCEDURE — 3078F DIAST BP <80 MM HG: CPT

## 2025-08-27 PROCEDURE — 3074F SYST BP LT 130 MM HG: CPT

## 2025-08-27 PROCEDURE — 3008F BODY MASS INDEX DOCD: CPT

## (undated) NOTE — LETTER
VACCINE ADMINISTRATION RECORD  PARENT / GUARDIAN APPROVAL  Date: 2021  Vaccine administered to: Tammy Welch     : 1985    MRN: [id number]    A copy of the appropriate Centers for Disease Control and Prevention Vaccine Information statement ha

## (undated) NOTE — LETTER
[facility]  Administration of Anesthesia  I, Uma Umaña agree to be cared for by a physician anesthesiologist alone and/or with a nurse anesthetist, who is specially trained to monitor me and give me medicine to put me to sleep or keep me comfortable during my procedure    I understand that my anesthesiologist and/or anesthetist is not an employee or agent of [facility] or [employer]. He or she works for [facility] [facility]    As the patient asking for anesthesia services, I agree to:  Allow the anesthesiologist (anesthesia doctor) to give me medicine and do additional procedures as necessary. Some examples are: Starting or using an “IV” to give me medicine, fluids or blood during my procedure, and having a breathing tube placed to help me breathe when I’m asleep (intubation). In the event that my heart stops working properly, I understand that my anesthesiologist will make every effort to sustain my life, unless otherwise directed by [facility] Do Not Resuscitate documents.  Tell my anesthesia doctor before my procedure:  If I am pregnant.  The last time that I ate or drank.  iii. All of the medicines I take (including prescriptions, herbal supplements, and pills I can buy without a prescription (including street drugs/illegal medications). Failure to inform my anesthesiologist about these medicines may increase my risk of anesthetic complications.  iv.If I am allergic to anything or have had a reaction to anesthesia before.  I understand how the anesthesia medicine will help me (benefits).  I understand that with any type of anesthesia medicine there are risks:  The most common risks are: nausea, vomiting, sore throat, muscle soreness, damage to my eyes, mouth, or teeth (from breathing tube placement).  Rare risks include: remembering what happened during my procedure, allergic reactions to medications, injury to my airway, heart, lungs, vision, nerves, or  muscles and in extremely rare instances death.  My doctor has explained to me other choices available to me for my care (alternatives).  Pregnant Patients (“epidural”):  I understand that the risks of having an epidural (medicine given into my back to help control pain during labor), include itching, low blood pressure, difficulty urinating, headache or slowing of the baby’s heart. Very rare risks include infection, bleeding, seizure, irregular heart rhythms and nerve injury.  Regional Anesthesia (“spinal”, “epidural”, & “nerve blocks”):  I understand that rare but potential complications include headache, bleeding, infection, seizure, irregular heart rhythms, and nerve injury.    _____________________________________________________________________________  Patient (or Representative) Signature/Relationship to Patient  Date   Time    _____________________________________________________________________________   Name (if used)    Language/Organization   Time    _____________________________________________________________________________  Nurse Anesthetist Signature     Date   Time  _____________________________________________________________________________  Anesthesiologist Signature     Date   Time  I have discussed the procedure and information above with the patient (or patient’s representative) and answered their questions. The patient or their representative has agreed to have anesthesia services.    _____________________________________________________________________________  Witness        Date   Time  I have verified that the signature is that of the patient or patient’s representative, and that it was signed before the procedure  Patient Name: Uma Umaña     : 1985                 Printed: 2025 at 7:56 PM    Medical Record #: [id number]                                            Page 1 of 1  ----------ANESTHESIA CONSENT----------

## (undated) NOTE — LETTER
VACCINE ADMINISTRATION RECORD  PARENT / GUARDIAN APPROVAL  Date: 2025  Vaccine administered to: Uma Umaña     : 1985    MRN: [id number]    A copy of the appropriate Centers for Disease Control and Prevention Vaccine Information statement has been provided. I have read or have had explained the information about the diseases and the vaccines listed below. There was an opportunity to ask questions and any questions were answered satisfactorily. I believe that I understand the benefits and risks of the vaccine cited and ask that the vaccine(s) listed below be given to me or to the person named above (for whom I am authorized to make this request).    VACCINES ADMINISTERED:  Tdap    I have read and hereby agree to be bound by the terms of this agreement as stated above. My signature is valid until revoked by me in writing.  This document is signed by SELF , relationship: SELF on 2025.:                                                                                                                                         Parent / Guardian Signature                                                Date    Dmitri SHEFFIELD served as a witness to authentication that the identity of the person signing electronically is in fact the person represented as signing.    This document was generated by Dmitri SHEFFIELD on 2025.

## (undated) NOTE — LETTER
[facility]  Administration of Anesthesia  1. I, Breana María, or _________________________________ acting on her behalf, (Patient) (Dependent/Representative) request to receive anesthesia for my pending procedure/operation/treatment.   A elify bleeding, seizure, cardiac arrest and death. 7. AWARENESS: I understand that it is possible (but unlikely) to have explicit memory of events from the operating room while under general anesthesia.   8. ELECTROCONVULSIVE THERAPY PATIENTS: This consent serve below affirms that prior to the time of the procedure, I have explained to the patient and/or his/her guardian, the risks and benefits of undergoing anesthesia, as well as any reasonable alternatives.     ___________________________________________________

## (undated) NOTE — LETTER
5/19/2025          To Whom It May Concern:    Uma Umaña is currently under my medical care for her pregnancy.  She may return to work and would encourage as much desk work as possible and avoid lifting anything heavier than 25lbs.     If you require additional information please contact our office.        Sincerely,    Emanuel Zurita DO          Document generated by:  Emanuel Zuirta DO

## (undated) NOTE — LETTER
21          RE:  Mayuri Lundy  :  1985      To Whom It May Concern:    Mayuri Lundy  is currently under our care for pregnancy. It is permissible at her gestational age for dental work to be performed.   However, if x-rays are needed, please m

## (undated) NOTE — Clinical Note
IUP at 20w2d  Normal level II Ultrasound  Left adnexal simple cyst  Cervical mass suspected by transabdominal ultrasound but it was not seen on transvaginal US  AMA, declined aneuploidy screening & prenatal genetic diagnostic testing    RECOMMENDATIONS:  C

## (undated) NOTE — LETTER
Date: 4/15/2025    Patient Name: Uma Umaña          To Whom it may concern:    This letter has been written at the patient's request. The above patient was seen at [facility] for treatment of a medical condition.    This patient should be excused from attending work/school for the next several days while lab results are pending due to medical reasons.         Sincerely,    Mabel Hamlin PA-C